# Patient Record
Sex: MALE | Race: WHITE | Employment: OTHER | URBAN - METROPOLITAN AREA
[De-identification: names, ages, dates, MRNs, and addresses within clinical notes are randomized per-mention and may not be internally consistent; named-entity substitution may affect disease eponyms.]

---

## 2017-06-29 ENCOUNTER — HOSPITAL ENCOUNTER (EMERGENCY)
Age: 57
Discharge: LEFT BEFORE DISCHARGE | End: 2017-06-29
Attending: EMERGENCY MEDICINE
Payer: COMMERCIAL

## 2017-06-29 VITALS
HEART RATE: 97 BPM | SYSTOLIC BLOOD PRESSURE: 112 MMHG | DIASTOLIC BLOOD PRESSURE: 70 MMHG | BODY MASS INDEX: 31.5 KG/M2 | WEIGHT: 220 LBS | TEMPERATURE: 98 F | RESPIRATION RATE: 23 BRPM | HEIGHT: 70 IN | OXYGEN SATURATION: 95 %

## 2017-06-29 DIAGNOSIS — R07.9 CHEST PAIN, UNSPECIFIED TYPE: Primary | ICD-10-CM

## 2017-06-29 DIAGNOSIS — F41.0 ANXIETY ATTACK: ICD-10-CM

## 2017-06-29 DIAGNOSIS — F10.929 ALCOHOL INTOXICATION, WITH UNSPECIFIED COMPLICATION (HCC): ICD-10-CM

## 2017-06-29 LAB
ALBUMIN SERPL BCP-MCNC: 3.7 G/DL (ref 3.5–5)
ALBUMIN/GLOB SERPL: 1.1 {RATIO} (ref 1.1–2.2)
ALP SERPL-CCNC: 92 U/L (ref 45–117)
ALT SERPL-CCNC: 42 U/L (ref 12–78)
ANION GAP BLD CALC-SCNC: 11 MMOL/L (ref 5–15)
AST SERPL W P-5'-P-CCNC: 34 U/L (ref 15–37)
ATRIAL RATE: 99 BPM
BASOPHILS # BLD AUTO: 0 K/UL (ref 0–0.1)
BASOPHILS # BLD: 0 % (ref 0–1)
BILIRUB SERPL-MCNC: 0.3 MG/DL (ref 0.2–1)
BUN SERPL-MCNC: 12 MG/DL (ref 6–20)
BUN/CREAT SERPL: 12 (ref 12–20)
CALCIUM SERPL-MCNC: 8.3 MG/DL (ref 8.5–10.1)
CALCULATED P AXIS, ECG09: 69 DEGREES
CALCULATED R AXIS, ECG10: 69 DEGREES
CALCULATED T AXIS, ECG11: 75 DEGREES
CHLORIDE SERPL-SCNC: 104 MMOL/L (ref 97–108)
CO2 SERPL-SCNC: 25 MMOL/L (ref 21–32)
CREAT SERPL-MCNC: 1.01 MG/DL (ref 0.7–1.3)
DIAGNOSIS, 93000: NORMAL
DIFFERENTIAL METHOD BLD: ABNORMAL
EOSINOPHIL # BLD: 0.5 K/UL (ref 0–0.4)
EOSINOPHIL NFR BLD: 6 % (ref 0–7)
ERYTHROCYTE [DISTWIDTH] IN BLOOD BY AUTOMATED COUNT: 15.9 % (ref 11.5–14.5)
GLOBULIN SER CALC-MCNC: 3.5 G/DL (ref 2–4)
GLUCOSE SERPL-MCNC: 110 MG/DL (ref 65–100)
HCT VFR BLD AUTO: 46.3 % (ref 36.6–50.3)
HGB BLD-MCNC: 15.7 G/DL (ref 12.1–17)
LIPASE SERPL-CCNC: 335 U/L (ref 73–393)
LYMPHOCYTES # BLD AUTO: 34 % (ref 12–49)
LYMPHOCYTES # BLD: 2.7 K/UL
MCH RBC QN AUTO: 32.4 PG (ref 26–34)
MCHC RBC AUTO-ENTMCNC: 33.9 G/DL (ref 30–36.5)
MCV RBC AUTO: 95.7 FL (ref 80–99)
MONOCYTES # BLD: 1.1 K/UL (ref 0–1)
MONOCYTES NFR BLD AUTO: 14 % (ref 5–13)
NEUTS SEG # BLD: 3.7 K/UL (ref 1.8–8)
NEUTS SEG NFR BLD AUTO: 46 % (ref 32–75)
P-R INTERVAL, ECG05: 136 MS
PLATELET # BLD AUTO: 207 K/UL (ref 150–400)
POTASSIUM SERPL-SCNC: 3.9 MMOL/L (ref 3.5–5.1)
PROT SERPL-MCNC: 7.2 G/DL (ref 6.4–8.2)
Q-T INTERVAL, ECG07: 342 MS
QRS DURATION, ECG06: 102 MS
QTC CALCULATION (BEZET), ECG08: 438 MS
RBC # BLD AUTO: 4.84 M/UL (ref 4.1–5.7)
SODIUM SERPL-SCNC: 140 MMOL/L (ref 136–145)
TROPONIN I BLD-MCNC: <0.04 NG/ML (ref 0–0.08)
VENTRICULAR RATE, ECG03: 99 BPM
WBC # BLD AUTO: 7.9 K/UL (ref 4.1–11.1)

## 2017-06-29 PROCEDURE — 99285 EMERGENCY DEPT VISIT HI MDM: CPT

## 2017-06-29 PROCEDURE — 83690 ASSAY OF LIPASE: CPT | Performed by: EMERGENCY MEDICINE

## 2017-06-29 PROCEDURE — 85025 COMPLETE CBC W/AUTO DIFF WBC: CPT | Performed by: EMERGENCY MEDICINE

## 2017-06-29 PROCEDURE — 36415 COLL VENOUS BLD VENIPUNCTURE: CPT | Performed by: EMERGENCY MEDICINE

## 2017-06-29 PROCEDURE — 84484 ASSAY OF TROPONIN QUANT: CPT

## 2017-06-29 PROCEDURE — 93005 ELECTROCARDIOGRAM TRACING: CPT

## 2017-06-29 PROCEDURE — 74011250637 HC RX REV CODE- 250/637: Performed by: EMERGENCY MEDICINE

## 2017-06-29 PROCEDURE — 80053 COMPREHEN METABOLIC PANEL: CPT | Performed by: EMERGENCY MEDICINE

## 2017-06-29 RX ORDER — DIAZEPAM 5 MG/1
5 TABLET ORAL
Status: COMPLETED | OUTPATIENT
Start: 2017-06-29 | End: 2017-06-29

## 2017-06-29 RX ORDER — DIAZEPAM 5 MG/1
TABLET ORAL
Status: DISCONTINUED
Start: 2017-06-29 | End: 2017-06-29 | Stop reason: HOSPADM

## 2017-06-29 RX ADMIN — DIAZEPAM 5 MG: 5 TABLET ORAL at 01:13

## 2017-06-29 NOTE — DISCHARGE INSTRUCTIONS
We hope that we have addressed all of your medical concerns. The examination and treatment you received in the Emergency Department were for an emergent problem and were not intended as complete care. It is important that you follow up with your healthcare provider(s) for ongoing care. If your symptoms worsen or do not improve as expected, and you are unable to reach your usual health care provider(s), you should return to the Emergency Department. Today's healthcare is undergoing tremendous change, and patient satisfaction surveys are one of the many tools to assess the quality of medical care. You may receive a survey from the [employer] regarding your experience in the Emergency Department. I hope that your experience has been completely positive, particularly the medical care that I provided. As such, please participate in the survey; anything less than excellent does not meet my expectations or intentions. [facility] and [facility] participate in nationally recognized quality of care measures. If your blood pressure is greater than 120/80, as reported below, we urge that you seek medical care to address the potential of high blood pressure, commonly known as hypertension. Hypertension can be hereditary or can be caused by certain medical conditions, pain, stress, or \"white coat syndrome. \"       Please make an appointment with your health care provider(s) for follow up of your Emergency Department visit. VITALS:   Patient Vitals for the past 8 hrs:   Temp Pulse Resp BP SpO2   06/29/17 0200 - 97 23 112/70 95 %   06/29/17 0130 - (!) 102 22 105/63 96 %   06/29/17 0100 - 93 12 125/71 95 %   06/29/17 0040 98 °F (36.7 °C) (!) 108 21 136/82 98 %          Thank you for allowing us to provide you with medical care today. We realize that you have many choices for your emergency care needs.   Please choose us in the future for any continued health care needs. Anthony Peña, [facility] [address]   Office: [phone]            Recent Results (from the past 24 hour(s))   EKG, 12 LEAD, INITIAL    Collection Time: 06/29/17 12:47 AM   Result Value Ref Range    Ventricular Rate 99 BPM    Atrial Rate 99 BPM    P-R Interval 136 ms    QRS Duration 102 ms    Q-T Interval 342 ms    QTC Calculation (Bezet) 438 ms    Calculated P Axis 69 degrees    Calculated R Axis 69 degrees    Calculated T Axis 75 degrees    Diagnosis       Normal sinus rhythm  Incomplete right bundle branch block  Borderline ECG  No previous ECGs available     CBC WITH AUTOMATED DIFF    Collection Time: 06/29/17 12:48 AM   Result Value Ref Range    WBC 7.9 4.1 - 11.1 K/uL    RBC 4.84 4.10 - 5.70 M/uL    HGB 15.7 12.1 - 17.0 g/dL    HCT 46.3 36.6 - 50.3 %    MCV 95.7 80.0 - 99.0 FL    MCH 32.4 26.0 - 34.0 PG    MCHC 33.9 30.0 - 36.5 g/dL    RDW 15.9 (H) 11.5 - 14.5 %    PLATELET 527 873 - 891 K/uL    NEUTROPHILS 46 32 - 75 %    LYMPHOCYTES 34 12 - 49 %    MONOCYTES 14 (H) 5 - 13 %    EOSINOPHILS 6 0 - 7 %    BASOPHILS 0 0 - 1 %    ABS. NEUTROPHILS 3.7 1.8 - 8.0 K/UL    ABS. LYMPHOCYTES 2.7 K/UL    ABS. MONOCYTES 1.1 (H) 0.0 - 1.0 K/UL    ABS. EOSINOPHILS 0.5 (H) 0.0 - 0.4 K/UL    ABS.  BASOPHILS 0.0 0.0 - 0.1 K/UL    DF AUTOMATED     POC TROPONIN-I    Collection Time: 06/29/17 12:48 AM   Result Value Ref Range    Troponin-I (POC) <0.04 0.00 - 8.21 ng/mL   METABOLIC PANEL, COMPREHENSIVE    Collection Time: 06/29/17  1:10 AM   Result Value Ref Range    Sodium 140 136 - 145 mmol/L    Potassium 3.9 3.5 - 5.1 mmol/L    Chloride 104 97 - 108 mmol/L    CO2 25 21 - 32 mmol/L    Anion gap 11 5 - 15 mmol/L    Glucose 110 (H) 65 - 100 mg/dL    BUN 12 6 - 20 MG/DL    Creatinine 1.01 0.70 - 1.30 MG/DL    BUN/Creatinine ratio 12 12 - 20      GFR est AA >60 >60 ml/min/1.73m2    GFR est non-AA >60 >60 ml/min/1.73m2    Calcium 8.3 (L) 8.5 - 10.1 MG/DL Bilirubin, total 0.3 0.2 - 1.0 MG/DL    ALT (SGPT) 42 12 - 78 U/L    AST (SGOT) 34 15 - 37 U/L    Alk. phosphatase 92 45 - 117 U/L    Protein, total 7.2 6.4 - 8.2 g/dL    Albumin 3.7 3.5 - 5.0 g/dL    Globulin 3.5 2.0 - 4.0 g/dL    A-G Ratio 1.1 1.1 - 2.2     LIPASE    Collection Time: 06/29/17  1:10 AM   Result Value Ref Range    Lipase 335 73 - 393 U/L            Chest Pain: Care Instructions  Your Care Instructions  There are many things that can cause chest pain. Some are not serious and will get better on their own in a few days. But some kinds of chest pain need more testing and treatment. Your doctor may have recommended a follow-up visit in the next 8 to 12 hours. If you are not getting better, you may need more tests or treatment. Even though your doctor has released you, you still need to watch for any problems. The doctor carefully checked you, but sometimes problems can develop later. If you have new symptoms or if your symptoms do not get better, get medical care right away. If you have worse or different chest pain or pressure that lasts more than 5 minutes or you passed out (lost consciousness), call 911 or seek other emergency help right away. A medical visit is only one step in your treatment. Even if you feel better, you still need to do what your doctor recommends, such as going to all suggested follow-up appointments and taking medicines exactly as directed. This will help you recover and help prevent future problems. How can you care for yourself at home? · Rest until you feel better. · Take your medicine exactly as prescribed. Call your doctor if you think you are having a problem with your medicine. · Do not drive after taking a prescription pain medicine. When should you call for help? Call 911 if:  · You passed out (lost consciousness). · You have severe difficulty breathing. · You have symptoms of a heart attack.  These may include:  ¨ Chest pain or pressure, or a strange feeling in your chest.  ¨ Sweating. ¨ Shortness of breath. ¨ Nausea or vomiting. ¨ Pain, pressure, or a strange feeling in your back, neck, jaw, or upper belly or in one or both shoulders or arms. ¨ Lightheadedness or sudden weakness. ¨ A fast or irregular heartbeat. After you call 911, the  may tell you to chew 1 adult-strength or 2 to 4 low-dose aspirin. Wait for an ambulance. Do not try to drive yourself. Call your doctor today if:  · You have any trouble breathing. · Your chest pain gets worse. · You are dizzy or lightheaded, or you feel like you may faint. · You are not getting better as expected. · You are having new or different chest pain. Where can you learn more? Go to http://johnson-monserrat.info/. Enter A120 in the search box to learn more about \"Chest Pain: Care Instructions. \"  Current as of: March 20, 2017  Content Version: 11.3  © 6716-4123 [employer]. Care instructions adapted under license by [employer] (which disclaims liability or warranty for this information). If you have questions about a medical condition or this instruction, always ask your healthcare professional. [address] any warranty or liability for your use of this information. [email or web address] Activation    Thank you for requesting access to [email or web address]. Please follow the instructions below to securely access and download your online medical record. [email or web address] allows you to send messages to your doctor, view your test results, renew your prescriptions, schedule appointments, and more. How Do I Sign Up? 1. In your internet browser, go to [email or web address]  2. Click on the First Time User? Click Here link in the Sign In box. You will be redirect to the New Member Sign Up page. 3. Enter your Park.[email or web address] Access Code exactly as it appears below. You will not need to use this code after youve completed the sign-up process.  If you do not sign up before the expiration date, you must request a new code. [employer] Access Code: [email or web address]  Expires: 2017  1:00 AM (This is the date your [employer] Reef access code will )    4. Enter the last four digits of your Social Security Number (xxxx) and Date of Birth (mm/dd/yyyy) as indicated and click Submit. You will be taken to the next sign-up page. 5. Create a Digital Reef ID. This will be your Digital Reef login ID and cannot be changed, so think of one that is secure and easy to remember. 6. Create a Digital Reef password. You can change your password at any time. 7. Enter your Password Reset Question and Answer. This can be used at a later time if you forget your password. 8. Enter your e-mail address. You will receive e-mail notification when new information is available in [address] 9. Click Sign Up. You can now view and download portions of your medical record. 10. Click the Download Summary menu link to download a portable copy of your medical information. Additional Information    If you have questions, please visit the Frequently Asked Questions section of the Digital [employer]ef website at [email or web address] Remember, [employer] Re[employer] is NOT to be used for urgent needs. For medical emergencies, dial 911. Panic Attacks: Care Instructions  Your Care Instructions  During a panic attack, you may have a feeling of intense fear or terror, trouble breathing, chest pain or tightness, heartbeat changes, dizziness, sweating, and shaking. A panic attack starts suddenly and usually lasts from 5 to 20 minutes but may last even longer. You have the most anxiety about 10 minutes after the attack starts. An attack can begin with a stressful event, or it can happen without a cause. Although panic attacks can cause scary symptoms, you can learn to manage them with self-care, counseling, and medicine. Follow-up care is a key part of your treatment and safety.  Be sure to make and go to all appointments, and call your doctor if you are having problems. It's also a good idea to know your test results and keep a list of the medicines you take. How can you care for yourself at home? · Take your medicine exactly as directed. Call your doctor if you think you are having a problem with your medicine. · Go to your counseling sessions and follow-up appointments. · Recognize and accept your anxiety. Then, when you are in a situation that makes you anxious, say to yourself, \"This is not an emergency. I feel uncomfortable, but I am not in danger. I can keep going even if I feel anxious. \"  · Be kind to your body:  ¨ Relieve tension with exercise or a massage. ¨ Get enough rest.  ¨ Avoid alcohol, caffeine, nicotine, and illegal drugs. They can increase your anxiety level, cause sleep problems, or trigger a panic attack. ¨ Learn and do relaxation techniques. See below for more about these techniques. · Engage your mind. Get out and do something you enjoy. Go to a funny movie, or take a walk or hike. Plan your day. Having too much or too little to do can make you anxious. · Keep a record of your symptoms. Discuss your fears with a good friend or family member, or join a support group for people with similar problems. Talking to others sometimes relieves stress. · Get involved in social groups, or volunteer to help others. Being alone sometimes makes things seem worse than they are. · Get at least 30 minutes of exercise on most days of the week to relieve stress. Walking is a good choice. You also may want to do other activities, such as running, swimming, cycling, or playing tennis or team sports. Relaxation techniques  Do relaxation exercises for 10 to 20 minutes a day. You can play soothing, relaxing music while you do them, if you wish. · Tell others in your house that you are going to do your relaxation exercises. Ask them not to disturb you. · Find a comfortable place, away from all distractions and noise.   · Lie down on your back, or sit with your back straight. · Focus on your breathing. Make it slow and steady. · Breathe in through your nose. Breathe out through either your nose or mouth. · Breathe deeply, filling up the area between your navel and your rib cage. Breathe so that your belly goes up and down. · Do not hold your breath. · Breathe like this for 5 to 10 minutes. Notice the feeling of calmness throughout your whole body. As you continue to breathe slowly and deeply, relax by doing the following for another 5 to 10 minutes:  · Tighten and relax each muscle group in your body. You can begin at your toes and work your way up to your head. · Imagine your muscle groups relaxing and becoming heavy. · Empty your mind of all thoughts. · Let yourself relax more and more deeply. · Become aware of the state of calmness that surrounds you. · When your relaxation time is over, you can bring yourself back to alertness by moving your fingers and toes and then your hands and feet and then stretching and moving your entire body. Sometimes people fall asleep during relaxation, but they usually wake up shortly afterward. · Always give yourself time to return to full alertness before you drive a car or do anything that might cause an accident if you are not fully alert. Never play a relaxation tape while driving a car. When should you call for help? Call 911 anytime you think you may need emergency care. For example, call if:  · You feel you cannot stop from hurting yourself or someone else. Watch closely for changes in your health, and be sure to contact your doctor if:  · Your panic attacks get worse. · You have new or different anxiety. · You are not getting better as expected. Where can you learn more? Go to http://johnson-monserrat.info/. Enter H601 in the search box to learn more about \"Panic Attacks: Care Instructions. \"  Current as of: July 26, 2016  Content Version: 11.3  © 4497-3794 Healthwise[employer]. Care instructions adapted under license by [employer] (which disclaims liability or warranty for this information). If you have questions about a medical condition or this instruction, always ask your healthcare professional. [address] any warranty or liability for your use of this information.

## 2017-06-29 NOTE — ED TRIAGE NOTES
Triage note: per EMS pt reports chest pressure, anxiety. Has been drinking a lot recently while he is changing jobs and is scared it is going to cost him his new job.

## 2017-06-29 NOTE — ED NOTES
The patient refused to stay for 2 hr repeat troponin and requesting to leave. Dr Mitra Self notified; AMA paperwork signed by pt. Pt and brett' left ambulatory out of the ED awaiting Cab discharge transportation.

## 2017-06-29 NOTE — ED PROVIDER NOTES
HPI Comments: The patient presents to the ED with chest pain. He was feeling week all day. He went to stand up around 11PM tonight when his heart rate when up and he developed L sided chest pain. He describes the pain as a pressure. Pain was 7/10, but is now improved to 2/10. He felt very dizzy and had mild shortness of breath. He had nausea. He dropped to his knees. He did not have any diaphoresis. He had 7 glasses of wine tonight. His neighbor gave him aspirin. He notes being under a lot of stress regarding his finances. He denies any other concerns. Patient is a 61 y.o. male presenting with chest pain. The history is provided by the patient and a significant other. Chest Pain (Angina)    Associated symptoms include dizziness, nausea, palpitations and shortness of breath. Pertinent negatives include no abdominal pain, no cough, no diaphoresis, no fever, no headaches, no vomiting and no weakness. Past Medical History:   Diagnosis Date    Hypertension        Past Surgical History:   Procedure Laterality Date    HX ORTHOPAEDIC      bilateral feet         History reviewed. No pertinent family history. Social History     Social History    Marital status:      Spouse name: N/A    Number of children: N/A    Years of education: N/A     Occupational History    Not on file. Social History Main Topics    Smoking status: Former Smoker    Smokeless tobacco: Never Used    Alcohol use Yes    Drug use: No    Sexual activity: Not on file     Other Topics Concern    Not on file     Social History Narrative    No narrative on file         ALLERGIES: Review of patient's allergies indicates no known allergies. Review of Systems   Constitutional: Negative for appetite change, chills, diaphoresis and fever. HENT: Negative for congestion, nosebleeds and sore throat. Eyes: Negative for discharge. Respiratory: Positive for shortness of breath. Negative for cough.     Cardiovascular: Positive for chest pain and palpitations. Gastrointestinal: Positive for diarrhea and nausea. Negative for abdominal pain and vomiting. Genitourinary: Negative for dysuria. Musculoskeletal: Negative. Skin: Negative for rash. Neurological: Positive for dizziness. Negative for weakness and headaches. Hematological: Negative for adenopathy. Psychiatric/Behavioral: Negative. All other systems reviewed and are negative. Vitals:    06/29/17 0040 06/29/17 0100 06/29/17 0130 06/29/17 0200   BP: 136/82 125/71 105/63 112/70   Pulse: (!) 108 93 (!) 102 97   Resp: 21 12 22 23   Temp: 98 °F (36.7 °C)      SpO2: 98% 95% 96% 95%   Weight: 99.8 kg (220 lb)      Height: 5' 10\" (1.778 m)               Physical Exam   Constitutional: He is oriented to person, place, and time. He appears well-developed and well-nourished. Smells of alcohol. HENT:   Head: Normocephalic and atraumatic. Mouth/Throat: Oropharynx is clear and moist.   Eyes: Conjunctivae are normal.   Neck: Normal range of motion. Neck supple. Cardiovascular: Normal rate, regular rhythm and normal heart sounds. Pulmonary/Chest: Effort normal and breath sounds normal.   Abdominal: Soft. Bowel sounds are normal. There is no tenderness. Musculoskeletal: Normal range of motion. He exhibits no edema or tenderness. Neurological: He is alert and oriented to person, place, and time. No cranial nerve deficit. He exhibits normal muscle tone. Coordination normal.   Skin: Skin is warm and dry. Psychiatric: He has a normal mood and affect. His behavior is normal.   Nursing note and vitals reviewed. [facility]  ED Course       Procedures      ED EKG interpretation:  Rhythm: normal sinus rhythm; and regular . Rate (approx.): 100; Axis: normal; P wave: normal; QRS interval: incomplete RBBB; ST/T wave: non-ischemic; in  Lead: This EKG was interpreted by Giuseppe Alexander MD,ED Provider. A/P:  1. Chest pain - unclear etiology.  I suspect anxiety plays large factor. I had planned further obsrvation with a second set of enzymes. He declined to stay. He was alert and oriented and clinically sober. He understood the risk of MI and chose to leave. He was given discharge papers, copy of labs, and copy of EKG. He has been advised to return ot ED or to see his PCP today. 2. Alcohol intoxication - advised to stop drinking alcohol. Patient's results have been reviewed with them. Patient and/or family have verbally conveyed their understanding and agreement of the patient's signs, symptoms, diagnosis, treatment and prognosis and additionally agree to follow up as recommended or return to the Emergency Room should their condition change prior to follow-up. Discharge instructions have also been provided to the patient with some educational information regarding their diagnosis as well a list of reasons why they would want to return to the ER prior to their follow-up appointment should their condition change.

## 2020-06-09 ENCOUNTER — HOSPITAL ENCOUNTER (OUTPATIENT)
Dept: PREADMISSION TESTING | Age: 60
Discharge: HOME | End: 2020-06-09
Payer: MEDICAID

## 2020-06-09 VITALS
HEART RATE: 85 BPM | TEMPERATURE: 98.4 F | DIASTOLIC BLOOD PRESSURE: 84 MMHG | HEIGHT: 70 IN | BODY MASS INDEX: 27.62 KG/M2 | SYSTOLIC BLOOD PRESSURE: 142 MMHG | WEIGHT: 192.9 LBS | RESPIRATION RATE: 20 BRPM | OXYGEN SATURATION: 98 %

## 2020-06-09 LAB
ANION GAP SERPL CALC-SCNC: 5 MMOL/L (ref 5–15)
BUN SERPL-MCNC: 13 MG/DL (ref 6–20)
BUN/CREAT SERPL: 19 (ref 12–20)
CALCIUM SERPL-MCNC: 9.4 MG/DL (ref 8.5–10.1)
CHLORIDE SERPL-SCNC: 104 MMOL/L (ref 97–108)
CO2 SERPL-SCNC: 30 MMOL/L (ref 21–32)
CREAT SERPL-MCNC: 0.69 MG/DL (ref 0.7–1.3)
GLUCOSE SERPL-MCNC: 97 MG/DL (ref 65–100)
POTASSIUM SERPL-SCNC: 4.9 MMOL/L (ref 3.5–5.1)
SODIUM SERPL-SCNC: 139 MMOL/L (ref 136–145)

## 2020-06-09 PROCEDURE — 36415 COLL VENOUS BLD VENIPUNCTURE: CPT

## 2020-06-09 PROCEDURE — 80048 BASIC METABOLIC PNL TOTAL CA: CPT

## 2020-06-09 PROCEDURE — 93005 ELECTROCARDIOGRAM TRACING: CPT

## 2020-06-09 PROCEDURE — 87635 SARS-COV-2 COVID-19 AMP PRB: CPT

## 2020-06-09 RX ORDER — TRAZODONE HYDROCHLORIDE 100 MG/1
100 TABLET ORAL
COMMUNITY

## 2020-06-09 RX ORDER — TRAMADOL HYDROCHLORIDE 50 MG/1
50 TABLET ORAL
COMMUNITY
End: 2021-03-31

## 2020-06-09 RX ORDER — DIAZEPAM 5 MG/1
5 TABLET ORAL EVERY MORNING
COMMUNITY

## 2020-06-09 RX ORDER — VENLAFAXINE 37.5 MG/1
75 TABLET ORAL EVERY MORNING
COMMUNITY

## 2020-06-09 RX ORDER — AMLODIPINE BESYLATE 5 MG/1
5 TABLET ORAL
COMMUNITY

## 2020-06-09 RX ORDER — SILDENAFIL 50 MG/1
50 TABLET, FILM COATED ORAL
COMMUNITY

## 2020-06-09 NOTE — PERIOP NOTES
6/9/20 - Call placed to [address], PTA medications completed. Call placed to [facility] at Dr. Chitra Caro office to verify consent and posting, she will call scheduling to have posting updated. 6/10/20 - Received message from patient, return call to patient and PTA medications verified. Call placed to [facility] at Dr. Chitra Caro office to verify consent and posting. Per [facility], posting is correct and hardware will not be removed. She will send new orders to PAT and new consent to be signed DOS.

## 2020-06-09 NOTE — PERIOP NOTES
[address]                   [facility], [address] [facility]   MAIN OR                                  [phone]   MAIN PRE OP                          [phone]                                                                                AMBULATORY PRE OP          [phone]  PRE-ADMISSION TESTING    [phone]   Surgery Date:  Saturday 6/13/20       Is surgery arrival time given by surgeon? NO  If NO, [facility] staff will call you between 3 and 7pm the day before your surgery with your arrival time. (If your surgery is on a Monday, we will call you the Friday before.)    Call [phone] after 7pm Monday-Friday if you did not receive this call. INSTRUCTIONS BEFORE YOUR SURGERY   When You  Arrive Arrive at the ER Admitting Desk on the day of your surgery  Have your insurance card, photo ID, and any copayment (if needed)   Food   and   Drink NO food or drink after midnight the night before surgery    This means NO water, gum, mints, coffee, juice, etc.  No alcohol (beer, wine, liquor) 24 hours before and after surgery   Medications to   TAKE   Morning of Surgery MEDICATIONS TO TAKE THE MORNING OF SURGERY WITH A SIP OF WATER:    Valium if needed, amlodipine, effexor    Hold viagra x 48hrs prior to surgery   Medications  To  STOP      7 days before surgery  Non-Steroidal anti-inflammatory Drugs (NSAID's): for example, Ibuprofen (Advil, Motrin), Naproxen (Aleve)   Aspirin, if taking for pain    Herbal supplements, vitamins, and fish oil   Other:  (Pain medications not listed above, including Tylenol may be taken)   Blood  Thinners     Bathing Clothing  Jewelry  Valuables      If you shower the morning of surgery, please do not apply anything to your skin (lotions, powders, deodorant)   Follow Chlorhexidine Care Fusion body wash instructions provided to you during PAT appointment. Begin 3 days prior to surgery.    Do not shave or trim anywhere 24 hours before surgery   Wear your hair loose or down; no pony-tails, buns, or metal hair clips   Wear loose, comfortable, clean clothes   Wear glasses instead of contacts   Leave money, valuables, and jewelry, including body piercings, at home   Going Home - or Spending the Night  SAME-DAY SURGERY: You must have a responsible adult drive you home and stay with you 24 hours after surgery   ADMITS: If your doctor is keeping you in the hospital after surgery, leave personal belongings/luggage in your car until you have a hospital room number. Hospital discharge time is 12 noon  Drivers must be here before 12 noon unless you are told differently   Special Instructions It is now mandated that all surgical patients be tested for COVID-19 prior to surgery. Patients are advised to self-quarantine at home after testing and prior to your surgery date. You will be notified if your results are positive. What to watch for:   Coronavirus (COVID-19) affects different people in different ways   It also appears with a wide range of symptoms from mild to severe   Signs usually appear 2-14 days after exposure     If you develop any of the following, notify your doctor immediately:  o Fever  o Chills, with or without a shiver  o Muscle pain  o Headache  o Sore throat  o Dry cough  o New loss of taste or smell  o Tiredness      If you develop any of the following, call 911:  o Shortness of breath  o Difficulty breathing  o Chest pain  o New confusion  o Blueness of fingers and/or lips     Follow all instructions so your surgery wont be cancelled. Please, be on time. If a situation occurs and you are delayed the day of surgery, call [phone] or [phone]. If your physical condition changes (like a fever, cold, flu, etc.) call your surgeon. Home medication(s) reviewed and verified via   [address] [place] [address] Street to pharmacy   during PAT appointment.     The patient was contacted by IN-PERSON  The patient verbalizes understanding of all instructions and   DOES NOT   need reinforcement.

## 2020-06-09 NOTE — H&P (VIEW-ONLY)
Preoperative Evaluation History and Physical with Surgical Risk Stratification 6/9/2020 CC: Right foot pain Surgery: FUSION FIRST METATARSOPHALANGEAL JOINT BONE RIGHT FOOT, BONE AUTOGRAFT RIGHT FOOT, PARTIAL EXCISION OF BONE 4TH TOE RIGHT FOOT, HAMMERTOE REPAIR FIFTH TOE RIGHT FOOT  
 
HPI:  
Antwan Babin is a 60 y.o. male referred for pre-operative evaluation by Dr. Anisha Villa for surgery on 6/13/20. Mr. Radha Samayoa states he had bilateral bunion surgery in 2006. He notes he has had bone growth along with calcium growth around his joint causing an bulge around his big toe along with pain. He notes he has bilateral foot pain but will do his right before his left. He notes he has increased pain with tight shoes. The patient was evaluated in the surgeon's office and it was determined that the most appropriate plan of care is to proceed with surgical intervention. Patient's PCP Eduin Saenz MD 
 
Review of Systems Constitutional: Negative for chills and fever HENT: Negative for congestion and sore throat Eyes: negative for blurred vision and double vision Respiratory: Negative for cough, shortness of breath and wheezing Mouth: Negative for loose, broken or chipped teeth. Cardiovascular: Negative for chest pain and palpitations Gastrointestinal: Negative for abdominal pain, constipation, diarrhea and nausea Genitourinary: Negative for dysuria and hematuria Musculoskeletal: Foot pain Skin: Negative for rash, open wounds. Negative for bruises easily Neurological: Negative for dizziness, tremors and headaches Psychiatric: Negative for depression. The patient is not nervous/anxious. Inherent Risk of Surgery Surgical risk:  Low Low:  EIntermediate:   High:   
Patient Cardiac Risk Assessment Revised Cardiac Risk Index (RCRI) Rate if cardiac death, nonfatal MI, nonfatal cardiac arrest by number of risk factor- 0.4% BERNARD/AHA 2007 Guidelines: 1) Surgery Emergency, Non-cardiac -> to surgery 2) If not, look at clinical predictors Major Intermediate Minor Blood Thinner: NA 
 
METS  
 
 EQUAL TO 4 Care for self Walk indoors around house Walk 2-3 blocks on level ground (2-3 mph) Light work around house (dust, dishes) Other Risk Factors:  
Screening for ETOH use:  Done and low risk Smoking status:  Former Drug Use: Last marijuana was March Personal or FH of bleeding problems:  No 
Personal or FH of blood clots:  No 
Personal or FH of anesthesia problems:   No 
 
Pulmonary Risk: 
Asthma or COPD:  No 
Body mass index is 28.08 kg/m². Known ANNA MARIE:  No 
BUN normal 
 
Past Medical, Surgical, Social History Allergies: No Known Allergies Medication Documentation Review Audit Reviewed by Mary Kate Hussein RN (Registered Nurse) on 06/09/20 at 6246 Medication Sig Documenting Provider Last Dose Status Taking? amLODIPine (NORVASC) 5 mg tablet Take 5 mg by mouth every morning. Provider, Historical  Active Yes  
diazePAM (Valium) 5 mg tablet Take 5 mg by mouth every six (6) hours as needed for Anxiety. Provider, Historical  Active Yes  
sildenafil citrate (Viagra) 50 mg tablet Take 50 mg by mouth daily as needed for Erectile Dysfunction. Provider, Historical  Active Yes  
traMADoL (ULTRAM) 50 mg tablet Take 50 mg by mouth every eight (8) hours as needed for Pain. Provider, Historical  Active Yes  
traZODone (DESYREL) 100 mg tablet Take 100 mg by mouth nightly. Provider, Historical  Active Yes  
venlafaxine (EFFEXOR) 37.5 mg tablet Take 37.5 mg by mouth daily. Provider, Historical  Active Yes Past Medical History:  
Diagnosis Date  Anxiety  Hx of migraines 2018  Hypertension  Insomnia disorder  Orbital fracture ([facility] [address] 2018 Right eye from fall  PTSD (post-traumatic stress disorder) Past Surgical History:  
Procedure Laterality Date  HX BUNIONECTOMY Bilateral 2006  HX COLONOSCOPY  2015  HX SEPTOPLASTY  [id number] Social History Tobacco Use  Smoking status: Former Smoker Packs/day: 0.50 Types: Cigarettes Last attempt to quit: 2020 Years since quittin.0  Smokeless tobacco: Never Used  Tobacco comment: Stopped for surgery- several weeks ago Substance Use Topics  Alcohol use: Yes Alcohol/week: 6.0 standard drinks Types: 6 Glasses of wine per week  Drug use: Not Currently Types: Marijuana Comment: Since 2020 Family History Problem Relation Age of Onset  Anesth Problems Neg Hx  Deep Vein Thrombosis Neg Hx Objective Vitals:  
 20 1058 BP: 142/84 Pulse: 85 Resp: 20 Temp: 98.4 °F (36.9 °C) SpO2: 98% Weight: 87.5 kg (192 lb 14.4 oz) Height: 5' 9.5\" (1.765 m) Constitutional:  Appears well,  No Acute Distress, Vitals noted Psychiatric:   Affect normal, Alert and Oriented to person/place/time Eyes:   Pupils equally round and reactive, EOMI, conjunctiva clear, eyelids normal 
ENT:   External ears and nose normal, teeth normal, gums normal, TMs and Orophyarynx normal 
Neck:   General inspection and Thyroid normal.  No abnormal cervical or supraclavicular nodes Lungs:   Clear to auscultation, good respiratory effort Heart: Ausculation normal.  Regular rhythm. No cardiac murmurs. No carotid bruits or palpable thrills Chest wall normal 
Musculoskeletal: Normal gait Extremities:   Without edema, good peripheral pulses Skin:   Warm to palpation, without rashes, bruising, or suspicious lesions Recent Results (from the past 72 hour(s)) METABOLIC PANEL, BASIC Collection Time: 20 11:40 AM  
Result Value Ref Range Sodium 139 136 - 145 mmol/L Potassium 4.9 3.5 - 5.1 mmol/L Chloride 104 97 - 108 mmol/L  
 CO2 30 21 - 32 mmol/L Anion gap 5 5 - 15 mmol/L Glucose 97 65 - 100 mg/dL BUN 13 6 - 20 MG/DL  Creatinine 0.69 (L) 0.70 - 1.30 MG/DL  
 BUN/Creatinine ratio 19 12 - 20 GFR est AA >60 >60 ml/min/1.73m2 GFR est non-AA >60 >60 ml/min/1.73m2 Calcium 9.4 8.5 - 10.1 MG/DL  
EKG, 12 LEAD, INITIAL Collection Time: 06/09/20 12:01 PM  
Result Value Ref Range Ventricular Rate 73 BPM  
 Atrial Rate 73 BPM  
 P-R Interval 130 ms QRS Duration 92 ms Q-T Interval 384 ms QTC Calculation (Bezet) 423 ms Calculated P Axis 1 degrees Calculated R Axis 60 degrees Calculated T Axis 81 degrees Diagnosis Normal sinus rhythm with sinus arrhythmia Normal ECG When compared with ECG of 29-JUN-2017 00:47, No significant change was found Assessment and Plan Assessment/Plan:  
1) Foot pain 2) Pre-Operative Evaluation Labs and EKG reviewed. M 
 
Preoperative Clearance Per RCRI, the patient has a 0.4% risk of cardiac death, nonfatal MI, nonfatal cardiac arrest based on no risk factors. Per ACC/AHA guidelines, patient is low risk for a(n) low risk surgery and may proceed to planned surgery with the above noted risk.  
 
Sumaya Woodward NP

## 2020-06-09 NOTE — H&P
Preoperative Evaluation                     History and Physical with Surgical Risk Stratification     6/9/2020    CC: Right foot pain  Surgery: FUSION FIRST METATARSOPHALANGEAL JOINT BONE RIGHT FOOT, BONE AUTOGRAFT RIGHT FOOT, PARTIAL EXCISION OF BONE 4TH TOE RIGHT FOOT, HAMMERTOE REPAIR FIFTH TOE RIGHT FOOT     HPI:   Mike Mike is a 60 y.o. male referred for pre-operative evaluation by Dr. Alaina Barreto for surgery on 6/13/20. Mr. Zander Odom states he had bilateral bunion surgery in 2006. He notes he has had bone growth along with calcium growth around his joint causing an bulge around his big toe along with pain. He notes he has bilateral foot pain but will do his right before his left. He notes he has increased pain with tight shoes. The patient was evaluated in the surgeon's office and it was determined that the most appropriate plan of care is to proceed with surgical intervention. Patient's PCP Fer Arenas MD    Review of Systems     Constitutional: Negative for chills and fever  HENT: Negative for congestion and sore throat  Eyes: negative for blurred vision and double vision  Respiratory: Negative for cough, shortness of breath and wheezing  Mouth: Negative for loose, broken or chipped teeth. Cardiovascular: Negative for chest pain and palpitations  Gastrointestinal: Negative for abdominal pain, constipation, diarrhea and nausea  Genitourinary: Negative for dysuria and hematuria  Musculoskeletal: Foot pain  Skin: Negative for rash, open wounds. Negative for bruises easily  Neurological: Negative for dizziness, tremors and headaches  Psychiatric: Negative for depression. The patient is not nervous/anxious.     Inherent Risk of Surgery     Surgical risk:  Low  Low:  EIntermediate:   High:    Patient Cardiac Risk Assessment     Revised Cardiac Risk Index (RCRI)    Rate if cardiac death, nonfatal MI, nonfatal cardiac arrest by number of risk factor- 0.4%    BERNARD/AHA 2007 Guidelines:   1) Surgery Emergency, Non-cardiac -> to surgery  2) If not, look at clinical predictors    Major Intermediate Minor       Blood Thinner: NA    METS      EQUAL TO 4 Care for self Walk indoors around house Walk 2-3 blocks on level ground (2-3 mph) Light work around house (dust, dishes)     Other Risk Factors:   Screening for ETOH use:  Done and low risk  Smoking status:  Former  Drug Use: Last marijuana was March     Personal or FH of bleeding problems:  No  Personal or FH of blood clots:  No  Personal or FH of anesthesia problems:   No    Pulmonary Risk:  Asthma or COPD:  No  Body mass index is 28.08 kg/m². Known ANNA MARIE:  No  BUN normal    Past Medical, Surgical, Social History     Allergies: No Known Allergies    Medication Documentation Review Audit     Reviewed by Abdias London RN (Registered Nurse) on 06/09/20 at [id number]    Medication Sig Documenting Provider Last Dose Status Taking? amLODIPine (NORVASC) 5 mg tablet Take 5 mg by mouth every morning. Provider, Historical  Active Yes   diazePAM (Valium) 5 mg tablet Take 5 mg by mouth every six (6) hours as needed for Anxiety. Provider, Historical  Active Yes   sildenafil citrate (Viagra) 50 mg tablet Take 50 mg by mouth daily as needed for Erectile Dysfunction. Provider, Historical  Active Yes   traMADoL (ULTRAM) 50 mg tablet Take 50 mg by mouth every eight (8) hours as needed for Pain. Provider, Historical  Active Yes   traZODone (DESYREL) 100 mg tablet Take 100 mg by mouth nightly. Provider, Historical  Active Yes   venlafaxine (EFFEXOR) 37.5 mg tablet Take 37.5 mg by mouth daily.  Provider, Historical  Active Yes                Past Medical History:   Diagnosis Date    Anxiety     Hx of migraines 2018    Hypertension     Insomnia disorder     Orbital fracture ([facility] [address] 2018    Right eye from fall    PTSD (post-traumatic stress disorder)      Past Surgical History:   Procedure Laterality Date    HX BUNIONECTOMY Bilateral 2006    HX COLONOSCOPY  2015    HX SEPTOPLASTY  1975     Social History     Tobacco Use    Smoking status: Former Smoker     Packs/day: 0.50     Types: Cigarettes     Last attempt to quit: 2020     Years since quittin.0    Smokeless tobacco: Never Used    Tobacco comment: Stopped for surgery- several weeks ago   Substance Use Topics    Alcohol use: Yes     Alcohol/week: 6.0 standard drinks     Types: 6 Glasses of wine per week    Drug use: Not Currently     Types: Marijuana     Comment: Since 2020     Family History   Problem Relation Age of Onset    Anesth Problems Neg Hx     Deep Vein Thrombosis Neg Hx        Objective     Vitals:    20 1058   BP: 142/84   Pulse: 85   Resp: 20   Temp: 98.4 °F (36.9 °C)   SpO2: 98%   Weight: 87.5 kg (192 lb 14.4 oz)   Height: 5' 9.5\" (1.765 m)       Constitutional:  Appears well,  No Acute Distress, Vitals noted  Psychiatric:   Affect normal, Alert and Oriented to person/place/time    Eyes:   Pupils equally round and reactive, EOMI, conjunctiva clear, eyelids normal  ENT:   External ears and nose normal, teeth normal, gums normal, TMs and Orophyarynx normal  Neck:   General inspection and Thyroid normal.  No abnormal cervical or supraclavicular nodes    Lungs:   Clear to auscultation, good respiratory effort  Heart: Ausculation normal.  Regular rhythm. No cardiac murmurs.   No carotid bruits or palpable thrills  Chest wall normal  Musculoskeletal: Normal gait  Extremities:   Without edema, good peripheral pulses  Skin:   Warm to palpation, without rashes, bruising, or suspicious lesions     Recent Results (from the past 72 hour(s))   METABOLIC PANEL, BASIC    Collection Time: 20 11:40 AM   Result Value Ref Range    Sodium 139 136 - 145 mmol/L    Potassium 4.9 3.5 - 5.1 mmol/L    Chloride 104 97 - 108 mmol/L    CO2 30 21 - 32 mmol/L    Anion gap 5 5 - 15 mmol/L    Glucose 97 65 - 100 mg/dL    BUN 13 6 - 20 MG/DL    Creatinine 0.69 (L) 0.70 - 1.30 MG/DL    BUN/Creatinine ratio 19 12 - 20      GFR est AA >60 >60 ml/min/1.73m2    GFR est non-AA >60 >60 ml/min/1.73m2    Calcium 9.4 8.5 - 10.1 MG/DL   EKG, 12 LEAD, INITIAL    Collection Time: 06/09/20 12:01 PM   Result Value Ref Range    Ventricular Rate 73 BPM    Atrial Rate 73 BPM    P-R Interval 130 ms    QRS Duration 92 ms    Q-T Interval 384 ms    QTC Calculation (Bezet) 423 ms    Calculated P Axis 1 degrees    Calculated R Axis 60 degrees    Calculated T Axis 81 degrees    Diagnosis       Normal sinus rhythm with sinus arrhythmia  Normal ECG  When compared with ECG of 29-JUN-2017 00:47,  No significant change was found         Assessment and Plan     Assessment/Plan:   1) Foot pain  2) Pre-Operative Evaluation    Labs and EKG reviewed. M    Preoperative Clearance  Per RCRI, the patient has a 0.4% risk of cardiac death, nonfatal MI, nonfatal cardiac arrest based on no risk factors. Per ACC/AHA guidelines, patient is low risk for a(n) low risk surgery and may proceed to planned surgery with the above noted risk.     Mya Rajan NP

## 2020-06-10 LAB
ATRIAL RATE: 73 BPM
CALCULATED P AXIS, ECG09: 1 DEGREES
CALCULATED R AXIS, ECG10: 60 DEGREES
CALCULATED T AXIS, ECG11: 81 DEGREES
DIAGNOSIS, 93000: NORMAL
P-R INTERVAL, ECG05: 130 MS
Q-T INTERVAL, ECG07: 384 MS
QRS DURATION, ECG06: 92 MS
QTC CALCULATION (BEZET), ECG08: 423 MS
SARS-COV-2, COV2NT: NOT DETECTED
VENTRICULAR RATE, ECG03: 73 BPM

## 2020-06-13 ENCOUNTER — ANESTHESIA EVENT (OUTPATIENT)
Dept: SURGERY | Age: 60
End: 2020-06-13
Payer: MEDICAID

## 2020-06-13 ENCOUNTER — ANESTHESIA (OUTPATIENT)
Dept: SURGERY | Age: 60
End: 2020-06-13
Payer: MEDICAID

## 2020-06-13 ENCOUNTER — HOSPITAL ENCOUNTER (OUTPATIENT)
Age: 60
Setting detail: OUTPATIENT SURGERY
Discharge: HOME | End: 2020-06-13
Attending: PODIATRIST | Admitting: PODIATRIST
Payer: MEDICAID

## 2020-06-13 ENCOUNTER — APPOINTMENT (OUTPATIENT)
Dept: GENERAL RADIOLOGY | Age: 60
End: 2020-06-13
Attending: PODIATRIST
Payer: MEDICAID

## 2020-06-13 VITALS
RESPIRATION RATE: 17 BRPM | TEMPERATURE: 97.5 F | HEART RATE: 78 BPM | SYSTOLIC BLOOD PRESSURE: 138 MMHG | DIASTOLIC BLOOD PRESSURE: 82 MMHG | OXYGEN SATURATION: 99 %

## 2020-06-13 PROBLEM — M94.271 CHONDROMALACIA, RIGHT ANKLE AND JOINTS OF RIGHT FOOT: Status: ACTIVE | Noted: 2020-06-13

## 2020-06-13 PROBLEM — M20.21 HALLUX RIGIDUS OF RIGHT FOOT: Status: ACTIVE | Noted: 2020-06-13

## 2020-06-13 PROBLEM — M79.671 RIGHT FOOT PAIN: Status: ACTIVE | Noted: 2020-06-13

## 2020-06-13 PROBLEM — M20.41 HAMMERTOE OF RIGHT FOOT: Status: ACTIVE | Noted: 2020-06-13

## 2020-06-13 PROBLEM — M77.51 BONE SPUR OF TOE OF RIGHT FOOT: Status: ACTIVE | Noted: 2020-06-13

## 2020-06-13 PROCEDURE — 74011000250 HC RX REV CODE- 250: Performed by: PODIATRIST

## 2020-06-13 PROCEDURE — C1769 GUIDE WIRE: HCPCS | Performed by: PODIATRIST

## 2020-06-13 PROCEDURE — 77030018836 HC SOL IRR NACL ICUM -A: Performed by: PODIATRIST

## 2020-06-13 PROCEDURE — 64447 NJX AA&/STRD FEMORAL NRV IMG: CPT | Performed by: NURSE ANESTHETIST, CERTIFIED REGISTERED

## 2020-06-13 PROCEDURE — 73630 X-RAY EXAM OF FOOT: CPT

## 2020-06-13 PROCEDURE — 74011250636 HC RX REV CODE- 250/636: Performed by: PODIATRIST

## 2020-06-13 PROCEDURE — 76010000153 HC OR TIME 1.5 TO 2 HR: Performed by: PODIATRIST

## 2020-06-13 PROCEDURE — 77030037938 HC BLD OSC SAW TREA -B: Performed by: PODIATRIST

## 2020-06-13 PROCEDURE — 76060000034 HC ANESTHESIA 1.5 TO 2 HR: Performed by: PODIATRIST

## 2020-06-13 PROCEDURE — C1713 ANCHOR/SCREW BN/BN,TIS/BN: HCPCS | Performed by: PODIATRIST

## 2020-06-13 PROCEDURE — 77030008845 HC WRE K STRY -B: Performed by: PODIATRIST

## 2020-06-13 PROCEDURE — 77030013837 HC NERV BLK KT BBMI -B: Performed by: NURSE ANESTHETIST, CERTIFIED REGISTERED

## 2020-06-13 PROCEDURE — 77030020833 HC CAP PROTCT PIN ASPN -A: Performed by: PODIATRIST

## 2020-06-13 PROCEDURE — 74011250636 HC RX REV CODE- 250/636: Performed by: NURSE ANESTHETIST, CERTIFIED REGISTERED

## 2020-06-13 PROCEDURE — 77030011640 HC PAD GRND REM COVD -A: Performed by: PODIATRIST

## 2020-06-13 PROCEDURE — 76210000006 HC OR PH I REC 0.5 TO 1 HR: Performed by: PODIATRIST

## 2020-06-13 PROCEDURE — 64445 NJX AA&/STRD SCIATIC NRV IMG: CPT | Performed by: NURSE ANESTHETIST, CERTIFIED REGISTERED

## 2020-06-13 PROCEDURE — 77030031139 HC SUT VCRL2 J&J -A: Performed by: PODIATRIST

## 2020-06-13 PROCEDURE — 77030003746: Performed by: PODIATRIST

## 2020-06-13 PROCEDURE — 77030000032 HC CUF TRNQT ZIMM -B: Performed by: PODIATRIST

## 2020-06-13 PROCEDURE — 74011250636 HC RX REV CODE- 250/636: Performed by: ANESTHESIOLOGY

## 2020-06-13 PROCEDURE — 74011000250 HC RX REV CODE- 250: Performed by: NURSE ANESTHETIST, CERTIFIED REGISTERED

## 2020-06-13 PROCEDURE — 74011000250 HC RX REV CODE- 250: Performed by: ANESTHESIOLOGY

## 2020-06-13 PROCEDURE — 77030002916 HC SUT ETHLN J&J -A: Performed by: PODIATRIST

## 2020-06-13 PROCEDURE — 77030003601 HC NDL NRV BLK BBMI -A: Performed by: NURSE ANESTHETIST, CERTIFIED REGISTERED

## 2020-06-13 PROCEDURE — 76210000021 HC REC RM PH II 0.5 TO 1 HR: Performed by: PODIATRIST

## 2020-06-13 PROCEDURE — 77030041049 HC RMR DISP STRY -D: Performed by: PODIATRIST

## 2020-06-13 PROCEDURE — 77030039824: Performed by: PODIATRIST

## 2020-06-13 PROCEDURE — 77030006773 HC BLD SAW OSC BRSM -A: Performed by: PODIATRIST

## 2020-06-13 RX ORDER — HYDROMORPHONE HYDROCHLORIDE 1 MG/ML
.5-1 INJECTION, SOLUTION INTRAMUSCULAR; INTRAVENOUS; SUBCUTANEOUS
Status: DISCONTINUED | OUTPATIENT
Start: 2020-06-13 | End: 2020-06-13 | Stop reason: HOSPADM

## 2020-06-13 RX ORDER — LIDOCAINE HYDROCHLORIDE 10 MG/ML
0.1 INJECTION, SOLUTION EPIDURAL; INFILTRATION; INTRACAUDAL; PERINEURAL AS NEEDED
Status: DISCONTINUED | OUTPATIENT
Start: 2020-06-13 | End: 2020-06-13 | Stop reason: HOSPADM

## 2020-06-13 RX ORDER — ONDANSETRON 2 MG/ML
4 INJECTION INTRAMUSCULAR; INTRAVENOUS AS NEEDED
Status: DISCONTINUED | OUTPATIENT
Start: 2020-06-13 | End: 2020-06-13 | Stop reason: HOSPADM

## 2020-06-13 RX ORDER — BUPIVACAINE HYDROCHLORIDE 5 MG/ML
INJECTION, SOLUTION EPIDURAL; INTRACAUDAL AS NEEDED
Status: DISCONTINUED | OUTPATIENT
Start: 2020-06-13 | End: 2020-06-13 | Stop reason: HOSPADM

## 2020-06-13 RX ORDER — PROPOFOL 10 MG/ML
INJECTION, EMULSION INTRAVENOUS
Status: DISCONTINUED | OUTPATIENT
Start: 2020-06-13 | End: 2020-06-13 | Stop reason: HOSPADM

## 2020-06-13 RX ORDER — MIDAZOLAM HYDROCHLORIDE 1 MG/ML
INJECTION, SOLUTION INTRAMUSCULAR; INTRAVENOUS
Status: COMPLETED
Start: 2020-06-13 | End: 2020-06-13

## 2020-06-13 RX ORDER — SODIUM CHLORIDE, SODIUM LACTATE, POTASSIUM CHLORIDE, CALCIUM CHLORIDE 600; 310; 30; 20 MG/100ML; MG/100ML; MG/100ML; MG/100ML
INJECTION, SOLUTION INTRAVENOUS
Status: DISCONTINUED | OUTPATIENT
Start: 2020-06-13 | End: 2020-06-13

## 2020-06-13 RX ORDER — FENTANYL CITRATE 50 UG/ML
INJECTION, SOLUTION INTRAMUSCULAR; INTRAVENOUS AS NEEDED
Status: DISCONTINUED | OUTPATIENT
Start: 2020-06-13 | End: 2020-06-13 | Stop reason: HOSPADM

## 2020-06-13 RX ORDER — LIDOCAINE HYDROCHLORIDE 20 MG/ML
INJECTION, SOLUTION INFILTRATION; PERINEURAL AS NEEDED
Status: DISCONTINUED | OUTPATIENT
Start: 2020-06-13 | End: 2020-06-13 | Stop reason: HOSPADM

## 2020-06-13 RX ORDER — FENTANYL CITRATE 50 UG/ML
INJECTION, SOLUTION INTRAMUSCULAR; INTRAVENOUS
Status: COMPLETED
Start: 2020-06-13 | End: 2020-06-13

## 2020-06-13 RX ORDER — MIDAZOLAM HYDROCHLORIDE 1 MG/ML
INJECTION, SOLUTION INTRAMUSCULAR; INTRAVENOUS AS NEEDED
Status: DISCONTINUED | OUTPATIENT
Start: 2020-06-13 | End: 2020-06-13 | Stop reason: HOSPADM

## 2020-06-13 RX ORDER — PROPOFOL 10 MG/ML
INJECTION, EMULSION INTRAVENOUS AS NEEDED
Status: DISCONTINUED | OUTPATIENT
Start: 2020-06-13 | End: 2020-06-13 | Stop reason: HOSPADM

## 2020-06-13 RX ORDER — SODIUM CHLORIDE, SODIUM LACTATE, POTASSIUM CHLORIDE, CALCIUM CHLORIDE 600; 310; 30; 20 MG/100ML; MG/100ML; MG/100ML; MG/100ML
125 INJECTION, SOLUTION INTRAVENOUS CONTINUOUS
Status: DISCONTINUED | OUTPATIENT
Start: 2020-06-13 | End: 2020-06-13 | Stop reason: HOSPADM

## 2020-06-13 RX ADMIN — PROPOFOL 140 MCG/KG/MIN: 10 INJECTION, EMULSION INTRAVENOUS at 10:43

## 2020-06-13 RX ADMIN — SODIUM CHLORIDE, POTASSIUM CHLORIDE, SODIUM LACTATE AND CALCIUM CHLORIDE: 600; 310; 30; 20 INJECTION, SOLUTION INTRAVENOUS at 10:00

## 2020-06-13 RX ADMIN — BUPIVACAINE HYDROCHLORIDE 10 ML/HR: 7.5 INJECTION, SOLUTION EPIDURAL; RETROBULBAR at 12:27

## 2020-06-13 RX ADMIN — PROPOFOL 30 MG: 10 INJECTION, EMULSION INTRAVENOUS at 10:43

## 2020-06-13 RX ADMIN — FENTANYL CITRATE 100 MCG: 0.05 INJECTION, SOLUTION INTRAMUSCULAR; INTRAVENOUS at 10:10

## 2020-06-13 RX ADMIN — HYDROMORPHONE HYDROCHLORIDE 0.5 MG: 1 INJECTION, SOLUTION INTRAMUSCULAR; INTRAVENOUS; SUBCUTANEOUS at 12:26

## 2020-06-13 RX ADMIN — CEFAZOLIN SODIUM 2 G: 1 POWDER, FOR SOLUTION INTRAMUSCULAR; INTRAVENOUS at 10:47

## 2020-06-13 RX ADMIN — MIDAZOLAM HYDROCHLORIDE 2 MG: 2 INJECTION, SOLUTION INTRAMUSCULAR; INTRAVENOUS at 10:10

## 2020-06-13 RX ADMIN — LIDOCAINE HYDROCHLORIDE 80 MG: 20 INJECTION, SOLUTION INFILTRATION; PERINEURAL at 10:43

## 2020-06-13 RX ADMIN — ROPIVACAINE HYDROCHLORIDE 30 ML: 5 INJECTION, SOLUTION EPIDURAL; INFILTRATION; PERINEURAL at 10:26

## 2020-06-13 NOTE — OP NOTES
Operative Report    Patient: Placido Park MRN: [id number]  SSN: [id number]    YOB: 1960  Age: 60 y.o. 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 He previously had a joint replacement on the left foot but continues to have pain to the joint. Right now the right is the worst between the two. Pain is with all range of motion. Radiographic imaging was obtained in the office which showed severe arthritic change to the joint. Patient also complained of pain due to right fourth and fifth toe contractures and bone spurs. Patient attempted nonsurgical management with medications as well as supportive shoe gear without resolution of his symptoms. Due to these findings I recommended surgical intervention. I discussed the procedures at length including the expected postoperative course, risks, alternatives, and potential complications. No guarantees were given. The patient elected to proceed. Procedure in Detail: This 25-year-old male received a popliteal and saphenous block in preoperative holding.  He was then brought back to the operating room and placed supine on the operating room table. After adequate IV sedation, the right foot and ankle were prepped and draped in usual aseptic fashion. Next the foot was exsanguinated using an Esmarch bandage. A pneumatic ankle tourniquet was inflated to 225 mmHg. Fusion right first mtpj with bone graft: Attention was directed to the right foot great toe joint. A linear longitudinal incision was made parallel and medial to the first metatarsophalangeal joint. Incision was deepened using sharp and blunt dissection with care taken to identify and retract vital neurovascular structures. Next a dorsal capsulotomy was performed medial and parallel to the extensor hallucis longus tendon. Soft tissue attachments were dissected free from the adjacent sides of the first metatarsal interphalangeal joint. The osteophytes medially dorsally and laterally were resected from the joint. Graft was harvested after a separate incision was made. Next the remaining cartilage was removed using a Reamer on adjacent sides of the joint. There was 90% cartilage next the joint was subchondrally drilled. A bone graft was then inserted. Temporary fixation was held using K wire fixation. Next a cannulated headless screw was then inserted across the joint in a distal medial to proximal lateral orientation with the toe and slight dorsiflexion. Next a dorsal five hole Nelsy plate was applied to the dorsal first metatarsophalangeal joint. Fixation was confirmed using fluoroscopy. Next the wound was irrigated with copious amounts of normal saline and closed in layers. Hammertoe repair right 5th toe  Next attention was directed to the right fifth toe deformity. Two elliptical incisions were made obliquely over the proximal interphalangeal joint. The interposing skin was then passed from the field. Incision was deepened to expose the dorsal fifth proximal interphalangeal joint.  A dorsal capsulotomy and tenotomy was performed. Soft tissue attachments were dissected free from the head of the proximal phalanx. Next the head of the proximal phalanx was then resected and passed from the field. Next the rotational and contracture deformity was reduced and the wound was closed in layers. Fluoroscopy was performed to confirm resection. Partial excision of bone right 4th toe:  Attention was directed to the right fourth toe where there was a prominent osteophytes noted to the lateral aspect of the fourth toe proximal interphalangeal joint. A dorsal incision was made over the proximal interphalangeal joint. This was deepened to expose the lateral prominence. The lateral prominence was then resected and passed from the field. The wound was then irrigated with copious amounts of normal saline and closed in layers. Postoperative dressing was applied. Patient tolerated the procedure and anesthesia well. Patient was then transferred to the PACU for postoperative monitoring with vital signs stable and vascular status intact. Estimated Blood Loss:  1mL    Tourniquet Time:   Total Tourniquet Time Documented: Ankle (Right) - 50 minutes  Total: Ankle (Right) - 50 minutes        Implants:   Implant Name Type Inv. Item Serial No.  Lot No. LRB No. Used Action   2.5 x 32mm headless screw    N/A [employer] N/A Right 1 Implanted   variAx Wilcox Straight Plate   N/A [employer] N/A Right 1 Implanted   2.7 x 14mm locking screw   N/A [employer] N/A Right 1 Implanted   2.7 x 16mm locking screw    N/A [employer] N/A Right 3 Implanted               Specimens: * No specimens in log *        Drains: None                Complications: None    Counts: Sponge and needle counts were correct times two.     Signed By:  Anuel Perez DPM     June 13, 2020

## 2020-06-13 NOTE — ANESTHESIA PROCEDURE NOTES
Peripheral Block    Start time: 6/13/2020 10:00 AM  End time: 6/13/2020 10:20 AM  Performed by: Dariel James MD  Authorized by: Dariel James MD       Pre-procedure: Indications: at surgeon's request and post-op pain management    Preanesthetic Checklist: patient identified, risks and benefits discussed, site marked, timeout performed and anesthesia consent given      Block Type:   Block Type:  Popliteal and saphenous  Laterality:  Right  Monitoring:  Continuous pulse ox, frequent vital sign checks, heart rate, responsive to questions and oxygen  Injection Technique:  Continuous  Procedures: ultrasound guided and nerve stimulator    Patient Position: supine  Prep: chlorhexidine    Location:  Lower thigh  Needle Type:  Tuohy  Needle Gauge:  18 G  Needle Localization:  Nerve stimulator and ultrasound guidance    Assessment:  Number of attempts:  1  Injection Assessment:  Incremental injection every 5 mL, local visualized surrounding nerve on ultrasound, negative aspiration for blood, no paresthesia and no intravascular symptoms  Patient tolerance:  Patient tolerated the procedure well with no immediate complications  Tibial nerve continuous  block performed with ultrasound, nerve stimulation and landmark identification. Needle utilized was 4\"stimuplex 21G. 20cc . 5% ropivacaine administered slowly with intermittent aspirations.     Saphenous under us guidance, 10cc

## 2020-06-13 NOTE — INTERVAL H&P NOTE
Update History & Physical 
 
The Patient's History and Physical of June 9, 2020 was reviewed with the patient and I examined the patient. There was no change. The surgical site was confirmed by the patient and me. The patient was counseled at length about the risks of sona Covid-19 during their perioperative period and any recovery window from their procedure. The patient was made aware that sona Covid-19  may worsen their prognosis for recovering from their procedure and lend to a higher morbidity and/or mortality risk. All material risks, benefits, and reasonable alternatives including postponing the procedure were discussed. The patient does  wish to proceed with the procedure at this time. Plan:  The risk, benefits, expected outcome, and alternative to the recommended procedure have been discussed with the patient. Patient understands and wants to proceed with the procedure.  
 
Electronically signed by Waleska Gongora DPM on 6/13/2020 at 9:59 AM

## 2020-06-13 NOTE — BRIEF OP NOTE
Brief Postoperative Note    Patient: Feliz Benitez  YOB: 1960  MRN: [id number]    Date of Procedure: 6/13/2020     Pre-Op Diagnosis:   Patient Active Problem List   Diagnosis Code    Right foot pain M79.671    Hallux rigidus of right foot M20.21    Hammertoe of right foot M20.41    Bone spur of toe of right foot M77.51    Chondromalacia, right ankle and joints of right foot M94.271     Post-Op Diagnosis:   Patient Active Problem List   Diagnosis Code    Right foot pain M79.671    Hallux rigidus of right foot M20.21    Hammertoe of right foot M20.41    Bone spur of toe of right foot M77.51    Chondromalacia, right ankle and joints of right foot M94.271     Procedure(s):  FUSION FIRST METATARSOPHALANGEAL JOINT BONE RIGHT FOOT   BONE AUTOGRAFT RIGHT FOOT  PARTIAL EXCISION OF BONE 4TH TOE RIGHT FOOT  HAMMERTOE REPAIR FIFTH TOE RIGHT FOOT   (MAC/POP/SAPENOUS WITH CATHETER)    Surgeon(s):  Glenis Davidson DPM    Surgical Assistant: None    Anesthesia: MAC     Estimated Blood Loss (mL): 1mL    Complications: None    Specimens: * No specimens in log *     Implants:   Implant Name Type Inv. Item Serial No.  Lot No. LRB No. Used Action   2.5 x 32mm headless screw    N/A [employer] N/A Right 1 Implanted   variAx Wilcox Straight Plate   N/A [employer] N/A Right 1 Implanted   2.7 x 14mm locking screw   N/A [employer] N/A Right 1 Implanted   2.7 x 16mm locking screw    N/A [employer] N/A Right 3 Implanted       Drains: * No LDAs found *    Findings: severe arthrosis first mtpj without any articular cartilage. Periarticular osteophytes.  Osteophyte 4th toe lateral, 5th toe digiti quinti varus hammertoe     Electronically Signed by Roscoe Fortune DPM on 6/13/2020 at 12:05 PM

## 2020-06-13 NOTE — DISCHARGE INSTRUCTIONS
Dr. Yumiko Vergara. Lety, ADALBERTO FACFAS FACFAOM FACCWS    The [facility]  Post Operative Instructions: You have had a surgical procedure on your right foot. Fluids and Diet:  Begin with clear liquids, broth, dry toast, and crackers. If not nauseated then resume your regular pre-operative diet when you are ready    Medications: Take your prescriptions as directed. Take pain medication tonight before bed even if there is no pain. Start aspirin daily tomorrow. If your pain is not severe then you may take the non-prescription medication that you normally take for aches and pains  You may resume your regularly scheduled medications (unless otherwise directed)  If any side effects or adverse reactions occur, discontinue the medication and contact your doctor. Review the patient drug information that is provided before you take any medication    Ambulation and Activity:  You are advised to go directly home from the hospital  Use walker, knee scooter as needed  You may not put weight on the operated foot. Avoid stairs if possible. Do not lift or move heavy objects  Do not drive until cleared by Dr. Jennifer Wallace and Wound Care Instructions:  Keep bandage clean and dry  Do not shower or bathe the operative extremity  Do not remove the bandage (unless otherwise directed)  Do not attempt to put anything between the cast or dressing and your skin, some itching is normal.    Ice and Elevation:  Elevate operative extremity as much as possible to reduce swelling and discomfort. Elevate with 2 pillows at or above the level of the heart for the first 72 hours. Ice:  [facility] dispensed insulated ice bag over the bandage 20 minutes of every hour while awake for the first 72 hours. You may behind the knee as well. Special Instructions: Call your doctor immediately if you develop any of the following. Fever over 100 degrees by mouth - take your temperature daily until your first follow up visit.   Pain not relieved by medication ordered  Swelling, increased redness, warmth, or hardness around operative area. Numb, tingling or cold toes. Toe(s) become white or bluish  Bandage becomes wet, soiled, or blood soaked (small amount of bleeding may be normal)  Increased or progressive drainage from surgical area. Follow up instructions: Your first post operative appointment is scheduled for Tuesday    Call Dr. Mendez Joshi office if you have any questions or concerns. DISCHARGE SUMMARY from your Nurse    The following personal items collected during your admission are returned to you:   Dental Appliance: Dental Appliances: Partials, Uppers, At home  Vision: Visual Aid: Glasses, With patient  Hearing Aid:    Jewelry: Jewelry: None  Clothing: Clothing: Undergarments, Shirt, Footwear, Shorts(in PACU)  Other Valuables: Other Valuables: Eyeglasses(with pt belongings)  Valuables sent to safe:      PATIENT INSTRUCTIONS:    After general anesthesia or intravenous sedation, for 24 hours or while taking prescription Narcotics:  · Limit your activities  · Do not drive and operate hazardous machinery  · Do not make important personal or business decisions  · Do  not drink alcoholic beverages  · If you have not urinated within 8 hours after discharge, please contact your surgeon on call. Report the following to your surgeon:  · Excessive pain, swelling, redness or odor of or around the surgical area  · Temperature over 100.5  · Nausea and vomiting lasting longer than 4 hours or if unable to take medications  · Any signs of decreased circulation or nerve impairment to extremity: change in color, persistent  numbness, tingling, coldness or increase pain  · Any questions    COUGH AND DEEP BREATHE    Breathing deep and coughing are very important exercises to do after surgery. Deep breathing and coughing open the little air tubes and air sacks in your lungs. You take deep breaths every day.   You may not even notice - it is just something you do when you sigh or yawn. It is a natural exercise you do to keep these air passages open. After surgery, take deep breaths and cough, on purpose. Coughing and deep breathing help prevent bronchitis and pneumonia after surgery. If you had chest or belly surgery, use a pillow as a \"hug nguyễn\" and hold it tightly to your chest or belly when you cough. DIRECTIONS:  · Take 10 to 15 slow deep breaths every hour while awake. · Breathe in deeply, and hold it for 2 seconds. · Exhale slowly through puckered lips, like blowing up a balloon. · After every 4th or 5th deep breath, hug your pillow to your chest or belly and give a hard, deep cough. Yes, it will probably hurt. But doing this exercise is very important part of healing after surgery. Take your pain medicine to help you do this exercise without too much pain. IF YOU HAVE BEEN DIAGNOSED WITH SLEEP APNEA, PLEASE USE YOUR SLEEP APNEA DEVICE OR CPAP MACHINE WHEN YOU INTEND TO NAP AFTER TAKING PAIN MEDICATION. Ankle Pumps    Ankle pumps increase the circulation of oxygenated blood to your lower extremities and decrease your risk for circulation problems such as blood clots. They also stretch the muscles, tendons and ligaments in your foot and ankle, and prevent joint contracture in the ankle and foot, especially after surgeries on the legs. It is important to do ankle pump exercises regularly after surgery because immobility increases your risk for developing a blood clot. Your doctor may also have you take an Aspirin for the next few days as well. If your doctor did not ask you to take an Aspirin, consult with him before starting Aspirin therapy on your own. Slowly point your foot forward, feeling the muscles on the top of your lower leg stretch, and hold this position for 5 seconds.                   Next, pull your foot back toward you as far as possible, stretching the calf muscles, and hold that position for 5 seconds. Repeat with the other foot. Perform 10 repetitions every hour while awake for both ankles if possible (down and then up with the foot once is one repetition). You should feel gentle stretching of the muscles in your lower leg when doing this exercise. If you feel pain, or your range of motion is limited, don't  Push too hard. Only go the limit your joint and muscles will let you go. If you have increasing pain, progressively worsening leg warmth or swelling, STOP the exercise and call your doctor. Below is information about the medications your doctor is prescribing after your visit:    Other information in your discharge envelope:  []     PRESCRIPTIONS  []     SCHOOL/WORK NOTE  []     INFECTION PREVENTION  []     REGIONAL NERVE BLOCK PAMPHLET  [x]     REGIONAL NERVE BLOCK ON QUE PAMPHLET   []     EXPAREL  []     HANDICAP APPLICATION         These are general instructions for a healthy lifestyle:    *  Please give a list of your current medications to your Primary Care Provider. *  Please update this list whenever your medications are discontinued, doses are      changed, or new medications (including over-the-counter products) are added. *  Please carry medication information at all times in case of emergency situations. About Smoking  No smoking / No tobacco products / Avoid exposure to second hand smoke    Surgeon General's Warning:  Quitting smoking now greatly reduces serious risk to your health. Obesity, smoking, and sedentary lifestyle greatly increases your risk for illness and disease. A healthy diet, regular physical exercise & weight monitoring are important for maintaining a healthy lifestyle.     Congestive Heart Failure  You may be retaining fluid if you have a history of heart failure or if you experience any of the following symptoms:  Weight gain of 3 pounds or more overnight or 5 pounds in a week, increased swelling in our hands or feet or shortness of breath while lying flat in bed. Please call your doctor as soon as you notice any of these symptoms; do not wait until your next office visit. Recognize signs and symptoms of STROKE:  F - face looks uneven  A - arms unable to move or move even  S - speech slurred or non-existent  T - time-call 911 as soon as signs and symptoms begin-DO NOT go         Back to bed or wait to see if you get better-TIME IS BRAIN. Warning signs of HEART ATTACK  Call 911 if you have these symptoms    · Chest discomfort. Most heart attacks involve discomfort in the center of the chest that lasts more than a few minutes, or that goes away and comes back. It can feel like uncomfortable pressure, squeezing, fullness, or pain. · Discomfort in other areas of the upper body. Symptoms can include pain or discomfort in one or both        Arms, the back, neck, jaw, or stomach. ·  Shortness of breath with or without chest discomfort. · Other signs may include breaking out in a cold sweat, nausea, or lightheadedness    Don't wait more than five minutes to call 911 - MINUTES MATTER! Fast action can save your life. Calling 911 is almost always the fastest way to get lifesaving treatment. Emergency Medical Services staff can begin treatment when they arrive - up to an hour sooner than if someone gets to the hospital by car.

## 2020-06-13 NOTE — ANESTHESIA PREPROCEDURE EVALUATION
Relevant Problems   No relevant active problems       Anesthetic History   No history of anesthetic complications            Review of Systems / Medical History  Patient summary reviewed, nursing notes reviewed and pertinent labs reviewed    Pulmonary  Within defined limits                 Neuro/Psych   Within defined limits           Cardiovascular  Within defined limits  Hypertension                   GI/Hepatic/Renal  Within defined limits              Endo/Other  Within defined limits      Arthritis     Other Findings              Physical Exam    Airway  Mallampati: II  TM Distance: 4 - 6 cm  Neck ROM: normal range of motion   Mouth opening: Normal     Cardiovascular    Rhythm: regular  Rate: normal         Dental  No notable dental hx       Pulmonary  Breath sounds clear to auscultation               Abdominal         Other Findings            Anesthetic Plan    ASA: 2  Anesthesia type: MAC      Post-op pain plan if not by surgeon: peripheral nerve block single      Anesthetic plan and risks discussed with: Patient

## 2020-06-13 NOTE — ANESTHESIA POSTPROCEDURE EVALUATION
Procedure(s):  FUSION FIRST METATARSOPHALANGEAL JOINT BONE RIGHT FOOT, BONE AUTOGRAFT RIGHT FOOT, PARTIAL EXCISION OF BONE 4TH TOE RIGHT FOOT, HAMMERTOE REPAIR FIFTH TOE RIGHT FOOT (MAC/POP/SAPENOUS WITH CATHETER). MAC    Anesthesia Post Evaluation        Patient location during evaluation: bedside  Level of consciousness: awake  Pain management: satisfactory to patient  Airway patency: patent  Anesthetic complications: no  Cardiovascular status: acceptable  Respiratory status: acceptable  Hydration status: acceptable        INITIAL Post-op Vital signs:   Vitals Value Taken Time   /66 6/13/2020 12:08 PM   Temp 36.1 °C (96.9 °F) 6/13/2020 12:08 PM   Pulse 59 6/13/2020 12:09 PM   Resp 12 6/13/2020 12:09 PM   SpO2 100 % 6/13/2020 12:09 PM   Vitals shown include unvalidated device data.

## 2021-03-31 ENCOUNTER — HOSPITAL ENCOUNTER (OUTPATIENT)
Dept: PREADMISSION TESTING | Age: 61
Discharge: HOME | End: 2021-03-31
Attending: PODIATRIST
Payer: MEDICAID

## 2021-03-31 VITALS
SYSTOLIC BLOOD PRESSURE: 157 MMHG | DIASTOLIC BLOOD PRESSURE: 97 MMHG | HEART RATE: 100 BPM | RESPIRATION RATE: 10 BRPM | OXYGEN SATURATION: 97 % | TEMPERATURE: 98.9 F | HEIGHT: 70 IN | BODY MASS INDEX: 28.78 KG/M2 | WEIGHT: 201.06 LBS

## 2021-03-31 LAB
ANION GAP SERPL CALC-SCNC: 6 MMOL/L (ref 5–15)
ATRIAL RATE: 99 BPM
BUN SERPL-MCNC: 7 MG/DL (ref 6–20)
BUN/CREAT SERPL: 9 (ref 12–20)
CALCIUM SERPL-MCNC: 9.5 MG/DL (ref 8.5–10.1)
CALCULATED P AXIS, ECG09: 72 DEGREES
CALCULATED R AXIS, ECG10: 77 DEGREES
CALCULATED T AXIS, ECG11: 84 DEGREES
CHLORIDE SERPL-SCNC: 101 MMOL/L (ref 97–108)
CO2 SERPL-SCNC: 28 MMOL/L (ref 21–32)
CREAT SERPL-MCNC: 0.76 MG/DL (ref 0.7–1.3)
DIAGNOSIS, 93000: NORMAL
GLUCOSE SERPL-MCNC: 87 MG/DL (ref 65–100)
P-R INTERVAL, ECG05: 130 MS
POTASSIUM SERPL-SCNC: 4.3 MMOL/L (ref 3.5–5.1)
Q-T INTERVAL, ECG07: 338 MS
QRS DURATION, ECG06: 92 MS
QTC CALCULATION (BEZET), ECG08: 433 MS
SODIUM SERPL-SCNC: 135 MMOL/L (ref 136–145)
VENTRICULAR RATE, ECG03: 99 BPM

## 2021-03-31 PROCEDURE — 80048 BASIC METABOLIC PNL TOTAL CA: CPT

## 2021-03-31 PROCEDURE — 93005 ELECTROCARDIOGRAM TRACING: CPT

## 2021-03-31 PROCEDURE — 36415 COLL VENOUS BLD VENIPUNCTURE: CPT

## 2021-03-31 RX ORDER — IBUPROFEN 200 MG
600 TABLET ORAL
COMMUNITY

## 2021-03-31 NOTE — PERIOP NOTES
[address] [address] [facility]   MAIN OR                                  [phone]   MAIN PRE OP                          [phone]                                                                                AMBULATORY PRE OP          [phone]  PRE-ADMISSION TESTING    [phone]   Surgery Date: Friday, April 9        Is surgery arrival time given by surgeon? NO  If NO, [facility] staff will call you between 3 and 7pm the day before your surgery with your arrival time. (If your surgery is on a Monday, we will call you the Friday before.)    Call [phone] after 7pm Monday-Friday if you did not receive this call. INSTRUCTIONS BEFORE YOUR SURGERY   When You  Arrive Arrive at the [address] [facility] on the day of your surgery  Have your insurance card, photo ID, and any copayment (if needed)   Food   and   Drink NO food or drink after midnight the night before surgery    This means NO water, gum, mints, coffee, juice, etc.  No alcohol (beer, wine, liquor) 24 hours before and after surgery   Medications to   TAKE   Morning of Surgery MEDICATIONS TO TAKE THE MORNING OF SURGERY WITH A SIP OF WATER:    Amlodipine   Diazepam   Effexor   Medications  To  STOP      7 days before surgery  Non-Steroidal anti-inflammatory Drugs (NSAID's): for example, Ibuprofen (Advil, Motrin), Naproxen (Aleve)   Aspirin, if taking for pain    Herbal supplements, vitamins, and fish oil   Other:  Stop Viagra 24 hours before DOS  (Pain medications not listed above, including Tylenol may be taken)   Blood  Thinners  If you take  Aspirin, Plavix, Coumadin, or any blood-thinning or anti-blood clot medicine, talk to the doctor who prescribed the medications for pre-operative instructions.    Bathing Clothing  Jewelry  Valuables      If you shower the morning of surgery, please do not apply anything to your skin (lotions, powders, deodorant, or makeup, especially nelson)   Follow Chlorhexidine Care Fusion body wash instructions provided to you during PAT appointment. Begin 3 days prior to surgery.  Do not shave or trim anywhere 24 hours before surgery   Wear your hair loose or down; no pony-tails, buns, or metal hair clips   Wear loose, comfortable, clean clothes   Wear glasses instead of contacts   Leave money, valuables, and jewelry, including body piercings, at home   Going Home - or Spending the Night  SAME-DAY SURGERY: You must have a responsible adult drive you home and stay with you 24 hours after surgery    Hospital discharge time is 12 noon  Drivers must be here before 12 noon unless you are told differently   Special Instructions It is now mandated that all surgical patients be tested for COVID-19 prior to surgery. Testing has to be exactly 4 days prior to surgery. Your COVID test date is Monday, April 5 between 8:00 am and 11:00 am.       COVID testing will be performed curbside at the [address] [place] [address] Dr wagner. There will be signs leading you to the testing site. You will need to bring a photo ID with you to be swabbed. Patients are advised to self-quarantine at home after testing and prior to your surgery date. You will be notified if your results are positive. What to watch for:   Coronavirus (COVID-19) affects different people in different ways   It also appears with a wide range of symptoms from mild to severe   Signs usually appear 2-14 days after exposure     If you develop any of the following, notify your doctor immediately:  o Fever  o Chills, with or without a shiver  o Muscle pain  o Headache  o Sore throat  o Dry cough  o New loss of taste or smell  o Tiredness      If you develop any of the following, call 911:  o Shortness of breath  o Difficulty breathing  o Chest pain  o New confusion  o Blueness of fingers and/or lips     Follow all instructions so your surgery wont be cancelled. Please, be on time. If a situation occurs and you are delayed the day of surgery, call [phone] or [phone]. If your physical condition changes (like a fever, cold, flu, etc.) call your surgeon. Home medication(s) reviewed and verified via  LIST   VERBAL   during PAT appointment. The patient was contacted by  IN-PERSON  The patient verbalizes understanding of all instructions and  DOES NOT   need reinforcement.

## 2021-04-01 NOTE — H&P (VIEW-ONLY)
History and Physical 
 
Patient: Prachi Medina MRN: [id number]  SSN: [id number] YOB: 1960  Age: 63 y.o. Sex: male Subjective:  
  
Prachi Medina is a 63 y.o. male who will be undergoing left foot surgery with Dr. Keli Dash on 21. Melanie Ordoñez notes he had a left bunion surgery many years ago and the hardware is causing pain. 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 He had his right foot taken care of in  of last year and notes he did great. Past Medical History:  
Diagnosis Date  Anxiety  Hx of migraines 2018  Hypertension  Insomnia disorder  Orbital fracture ([address] 2018 Right eye from fall  PTSD (post-traumatic stress disorder)  Tinnitus Past Surgical History:  
Procedure Laterality Date  HX BUNIONECTOMY Bilateral   HX COLONOSCOPY  2015  HX HAMMER TOE REPAIR Right 2020  HX SEPTOPLASTY  [id number] Family History Problem Relation Age of Onset  Anesth Problems Neg Hx  Deep Vein Thrombosis Neg Hx Social History Tobacco Use  Smoking status: Former Smoker Packs/day: 0.50 Types: Cigarettes Quit date: 2020 Years since quittin.8  Smokeless tobacco: Never Used  Tobacco comment: Stopped for surgery- several weeks ago Substance Use Topics  Alcohol use: Yes Alcohol/week: 6.0 standard drinks Types: 6 Glasses of wine per week  Drug use: Yes Frequency: 3.0 times per week Types: Marijuana Prior to Admission medications Medication Sig Start Date End Date Taking? Authorizing Provider  
ibuprofen (Motrin IB) 200 mg tablet Take 600 mg by mouth two (2) times daily as needed for Pain. Yes Provider, Historical  
diazePAM (Valium) 5 mg tablet Take 5 mg by mouth Every morning. Yes Provider, Historical  
amLODIPine (NORVASC) 5 mg tablet Take 5 mg by mouth every morning.    Yes Provider, Historical  
traZODone (DESYREL) 100 mg tablet Take 100 mg by mouth nightly. Yes Provider, Historical  
sildenafil citrate (Viagra) 50 mg tablet Take 50 mg by mouth daily as needed for Erectile Dysfunction. Yes Provider, Historical  
venlafaxine (EFFEXOR) 37.5 mg tablet Take 75 mg by mouth Every morning. Yes Provider, Historical  
OTHER Take 1 Package by mouth daily. Vitamin Pack   Yes Provider, Historical  
  
 
No Known Allergies Review of Systems: 
Constitutional: Negative for chills and fever HENT: Negative for congestion and sore throat Eyes: negative for blurred vision and double vision Respiratory: Negative for cough, shortness of breath and wheezing Mouth: Negative for loose, broken or chipped teeth. Cardiovascular: Negative for chest pain and palpitations Gastrointestinal: Negative for abdominal pain, constipation, diarrhea and nausea Genitourinary: Negative for dysuria and hematuria Musculoskeletal: Left foot pain Skin: Negative for rash, open wounds. Negative for bruises easily Neurological: Negative for dizziness, tremors and headaches Psychiatric: Negative for depression. The patient is not nervous/anxious. Objective:  
 
Vitals:  
 03/31/21 2558 BP: (!) 157/97 Pulse: 100 Resp: 10 Temp: 98.9 °F (37.2 °C) SpO2: 97% Weight: 91.2 kg (201 lb 1 oz) Height: 5' 10\" (1.778 m) Physical Exam: 
Constitutional:  Appears well,  No Acute Distress, Vitals noted Psychiatric:   Affect normal, Alert and Oriented to person/place/time Eyes:   Pupils equally round and reactive, EOMI, conjunctiva clear, eyelids normal 
ENT:   External ears and nose normal, teeth normal, gums normal, TMs and Orophyarynx normal 
Neck:   General inspection and Thyroid normal.  No abnormal cervical or supraclavicular nodes Lungs:   Clear to auscultation, good respiratory effort Heart: Ausculation normal.  Regular rhythm. No cardiac murmurs. No carotid bruits or palpable thrills Chest wall normal 
Musculoskeletal: Normal gait Extremities:   Without edema, good peripheral pulses Skin:   Warm to palpation, without rashes, bruising, or suspicious lesions Recent Results (from the past 72 hour(s)) METABOLIC PANEL, BASIC Collection Time: 03/31/21  9:16 AM  
Result Value Ref Range Sodium 135 (L) 136 - 145 mmol/L Potassium 4.3 3.5 - 5.1 mmol/L Chloride 101 97 - 108 mmol/L  
 CO2 28 21 - 32 mmol/L Anion gap 6 5 - 15 mmol/L Glucose 87 65 - 100 mg/dL BUN 7 6 - 20 MG/DL Creatinine 0.76 0.70 - 1.30 MG/DL  
 BUN/Creatinine ratio 9 (L) 12 - 20 GFR est AA >60 >60 ml/min/1.73m2 GFR est non-AA >60 >60 ml/min/1.73m2 Calcium 9.5 8.5 - 10.1 MG/DL  
EKG, 12 LEAD, INITIAL Collection Time: 03/31/21  9:37 AM  
Result Value Ref Range Ventricular Rate 99 BPM  
 Atrial Rate 99 BPM  
 P-R Interval 130 ms QRS Duration 92 ms Q-T Interval 338 ms QTC Calculation (Bezet) 433 ms Calculated P Axis 72 degrees Calculated R Axis 77 degrees Calculated T Axis 84 degrees Diagnosis Normal sinus rhythm Normal ECG When compared with ECG of 09-JUN-2020 12:01, No significant change was found Confirmed by Derian Hoover MD., Jorge ([id number]) on 3/31/2021 8:44:25 PM 
  
 
 
 
Assessment:  
 
Left foot pain Plan:  
 
FUSION 1ST METATARSAL PHALANGEAL JOINT LEFT FOOT, REMOVAL OF HARDWARE LEFT, 2ND METATARSAL OSTEOTOMY LEFT FOOTM CORRECTION OF 2ND TOE LEFT FOOT, BONE AUTO GRAFT LEFT FOOT  
 
BMP and EKG reviewed Signed By: Ysabel Mayer NP April 1, 2021

## 2021-04-01 NOTE — H&P
History and Physical    Patient: Tolu White MRN: [id number]  SSN: [id number]    YOB: 1960  Age: 63 y.o. Sex: male      Subjective:      Tolu White is a 63 y.o. male who will be undergoing left foot surgery with Dr. Saira Stewart on 21. [place] De La Paz notes he had a left bunion surgery many years ago and the hardware is causing pain. 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 He had his right foot taken care of in  of last year and notes he did great. Past Medical History:   Diagnosis Date    Anxiety     Hx of migraines 2018    Hypertension     Insomnia disorder     Orbital fracture ([address] 2018    Right eye from fall    PTSD (post-traumatic stress disorder)     Tinnitus      Past Surgical History:   Procedure Laterality Date    HX BUNIONECTOMY Bilateral 2006    HX COLONOSCOPY  2015    HX HAMMER TOE REPAIR Right 2020    HX SEPTOPLASTY  1975      Family History   Problem Relation Age of Onset    Anesth Problems Neg Hx     Deep Vein Thrombosis Neg Hx      Social History     Tobacco Use    Smoking status: Former Smoker     Packs/day: 0.50     Types: Cigarettes     Quit date: 2020     Years since quittin.8    Smokeless tobacco: Never Used    Tobacco comment: Stopped for surgery- several weeks ago   Substance Use Topics    Alcohol use: Yes     Alcohol/week: 6.0 standard drinks     Types: 6 Glasses of wine per week    Drug use: Yes     Frequency: 3.0 times per week     Types: Marijuana      Prior to Admission medications    Medication Sig Start Date End Date Taking? Authorizing Provider   ibuprofen (Motrin IB) 200 mg tablet Take 600 mg by mouth two (2) times daily as needed for Pain. Yes Provider, Historical   diazePAM (Valium) 5 mg tablet Take 5 mg by mouth Every morning. Yes Provider, Historical   amLODIPine (NORVASC) 5 mg tablet Take 5 mg by mouth every morning.    Yes Provider, Historical   traZODone (DESYREL) 100 mg tablet Take 100 mg by mouth nightly. Yes Provider, Historical   sildenafil citrate (Viagra) 50 mg tablet Take 50 mg by mouth daily as needed for Erectile Dysfunction. Yes Provider, Historical   venlafaxine (EFFEXOR) 37.5 mg tablet Take 75 mg by mouth Every morning. Yes Provider, Historical   OTHER Take 1 Package by mouth daily. Vitamin Pack   Yes Provider, Historical        No Known Allergies    Review of Systems:  Constitutional: Negative for chills and fever  HENT: Negative for congestion and sore throat  Eyes: negative for blurred vision and double vision  Respiratory: Negative for cough, shortness of breath and wheezing  Mouth: Negative for loose, broken or chipped teeth. Cardiovascular: Negative for chest pain and palpitations  Gastrointestinal: Negative for abdominal pain, constipation, diarrhea and nausea  Genitourinary: Negative for dysuria and hematuria  Musculoskeletal: Left foot pain  Skin: Negative for rash, open wounds. Negative for bruises easily  Neurological: Negative for dizziness, tremors and headaches  Psychiatric: Negative for depression. The patient is not nervous/anxious. Objective:     Vitals:    03/31/21 0821   BP: (!) 157/97   Pulse: 100   Resp: 10   Temp: 98.9 °F (37.2 °C)   SpO2: 97%   Weight: 91.2 kg (201 lb 1 oz)   Height: 5' 10\" (1.778 m)        Physical Exam:  Constitutional:  Appears well,  No Acute Distress, Vitals noted  Psychiatric:   Affect normal, Alert and Oriented to person/place/time    Eyes:   Pupils equally round and reactive, EOMI, conjunctiva clear, eyelids normal  ENT:   External ears and nose normal, teeth normal, gums normal, TMs and Orophyarynx normal  Neck:   General inspection and Thyroid normal.  No abnormal cervical or supraclavicular nodes    Lungs:   Clear to auscultation, good respiratory effort  Heart: Ausculation normal.  Regular rhythm. No cardiac murmurs.   No carotid bruits or palpable thrills  Chest wall normal  Musculoskeletal: Normal gait  Extremities:   Without edema, good peripheral pulses  Skin:   Warm to palpation, without rashes, bruising, or suspicious lesions     Recent Results (from the past 72 hour(s))   METABOLIC PANEL, BASIC    Collection Time: 03/31/21  9:16 AM   Result Value Ref Range    Sodium 135 (L) 136 - 145 mmol/L    Potassium 4.3 3.5 - 5.1 mmol/L    Chloride 101 97 - 108 mmol/L    CO2 28 21 - 32 mmol/L    Anion gap 6 5 - 15 mmol/L    Glucose 87 65 - 100 mg/dL    BUN 7 6 - 20 MG/DL    Creatinine 0.76 0.70 - 1.30 MG/DL    BUN/Creatinine ratio 9 (L) 12 - 20      GFR est AA >60 >60 ml/min/1.73m2    GFR est non-AA >60 >60 ml/min/1.73m2    Calcium 9.5 8.5 - 10.1 MG/DL   EKG, 12 LEAD, INITIAL    Collection Time: 03/31/21  9:37 AM   Result Value Ref Range    Ventricular Rate 99 BPM    Atrial Rate 99 BPM    P-R Interval 130 ms    QRS Duration 92 ms    Q-T Interval 338 ms    QTC Calculation (Bezet) 433 ms    Calculated P Axis 72 degrees    Calculated R Axis 77 degrees    Calculated T Axis 84 degrees    Diagnosis       Normal sinus rhythm  Normal ECG  When compared with ECG of 09-JUN-2020 12:01,  No significant change was found  Confirmed by Laura Stroud MD., Jorge ([id number]) on 3/31/2021 8:44:25 PM           Assessment:     Left foot pain    Plan:     FUSION 1ST METATARSAL PHALANGEAL JOINT LEFT FOOT, REMOVAL OF HARDWARE LEFT, 2ND METATARSAL OSTEOTOMY LEFT FOOTM CORRECTION OF 2ND TOE LEFT FOOT, BONE AUTO GRAFT LEFT FOOT     BMP and EKG reviewed    Signed By: Guillermina Galvan NP     April 1, 2021

## 2021-04-05 ENCOUNTER — HOSPITAL ENCOUNTER (OUTPATIENT)
Dept: PREADMISSION TESTING | Age: 61
Discharge: HOME | End: 2021-04-05
Payer: MEDICAID

## 2021-04-05 PROCEDURE — U0005 INFEC AGEN DETEC AMPLI PROBE: HCPCS

## 2021-04-06 LAB — SARS-COV-2, COV2NT: NOT DETECTED

## 2021-04-08 ENCOUNTER — ANESTHESIA EVENT (OUTPATIENT)
Dept: SURGERY | Age: 61
End: 2021-04-08
Payer: MEDICAID

## 2021-04-09 ENCOUNTER — APPOINTMENT (OUTPATIENT)
Dept: GENERAL RADIOLOGY | Age: 61
End: 2021-04-09
Attending: PODIATRIST
Payer: MEDICAID

## 2021-04-09 ENCOUNTER — ANESTHESIA (OUTPATIENT)
Dept: SURGERY | Age: 61
End: 2021-04-09
Payer: MEDICAID

## 2021-04-09 ENCOUNTER — HOSPITAL ENCOUNTER (OUTPATIENT)
Age: 61
Setting detail: OUTPATIENT SURGERY
Discharge: HOME | End: 2021-04-09
Attending: PODIATRIST | Admitting: PODIATRIST
Payer: MEDICAID

## 2021-04-09 VITALS
BODY MASS INDEX: 28.78 KG/M2 | TEMPERATURE: 97.1 F | SYSTOLIC BLOOD PRESSURE: 145 MMHG | RESPIRATION RATE: 13 BRPM | WEIGHT: 201.06 LBS | HEIGHT: 70 IN | DIASTOLIC BLOOD PRESSURE: 86 MMHG | OXYGEN SATURATION: 94 % | HEART RATE: 68 BPM

## 2021-04-09 PROBLEM — M20.5X2 CROSSOVER TOE DEFORMITY OF LEFT FOOT: Status: ACTIVE | Noted: 2021-04-09

## 2021-04-09 PROBLEM — M20.21 HALLUX RIGIDUS OF RIGHT FOOT: Status: RESOLVED | Noted: 2020-06-13 | Resolved: 2021-04-09

## 2021-04-09 PROBLEM — M79.672 FOOT PAIN, LEFT: Status: ACTIVE | Noted: 2021-04-09

## 2021-04-09 PROBLEM — M20.41 HAMMERTOE OF RIGHT FOOT: Status: RESOLVED | Noted: 2020-06-13 | Resolved: 2021-04-09

## 2021-04-09 PROBLEM — M79.671 RIGHT FOOT PAIN: Status: RESOLVED | Noted: 2020-06-13 | Resolved: 2021-04-09

## 2021-04-09 PROBLEM — M77.42 METATARSALGIA OF LEFT FOOT: Status: ACTIVE | Noted: 2021-04-09

## 2021-04-09 PROBLEM — M94.272 CHONDROMALACIA, LEFT ANKLE AND JOINTS OF LEFT FOOT: Status: ACTIVE | Noted: 2020-06-13

## 2021-04-09 PROBLEM — M77.51 BONE SPUR OF TOE OF RIGHT FOOT: Status: RESOLVED | Noted: 2020-06-13 | Resolved: 2021-04-09

## 2021-04-09 PROBLEM — M94.271 CHONDROMALACIA, RIGHT ANKLE AND JOINTS OF RIGHT FOOT: Status: RESOLVED | Noted: 2020-06-13 | Resolved: 2021-04-09

## 2021-04-09 PROBLEM — M20.22 HALLUX RIGIDUS OF LEFT FOOT: Status: ACTIVE | Noted: 2020-06-13

## 2021-04-09 PROCEDURE — 64445 NJX AA&/STRD SCIATIC NRV IMG: CPT | Performed by: ANESTHESIOLOGY

## 2021-04-09 PROCEDURE — 2709999900 HC NON-CHARGEABLE SUPPLY: Performed by: PODIATRIST

## 2021-04-09 PROCEDURE — 74011250636 HC RX REV CODE- 250/636: Performed by: NURSE ANESTHETIST, CERTIFIED REGISTERED

## 2021-04-09 PROCEDURE — 77030010507 HC ADH SKN DERMBND J&J -B: Performed by: ANESTHESIOLOGY

## 2021-04-09 PROCEDURE — 77030040361 HC SLV COMPR DVT MDII -B

## 2021-04-09 PROCEDURE — 77030031139 HC SUT VCRL2 J&J -A: Performed by: PODIATRIST

## 2021-04-09 PROCEDURE — 77030013837 HC NERV BLK KT BBMI -B: Performed by: ANESTHESIOLOGY

## 2021-04-09 PROCEDURE — 77030003747: Performed by: PODIATRIST

## 2021-04-09 PROCEDURE — 73620 X-RAY EXAM OF FOOT: CPT

## 2021-04-09 PROCEDURE — 74011250637 HC RX REV CODE- 250/637: Performed by: ANESTHESIOLOGY

## 2021-04-09 PROCEDURE — 74011250636 HC RX REV CODE- 250/636: Performed by: ANESTHESIOLOGY

## 2021-04-09 PROCEDURE — 74011000250 HC RX REV CODE- 250: Performed by: ANESTHESIOLOGY

## 2021-04-09 PROCEDURE — 76210000020 HC REC RM PH II FIRST 0.5 HR: Performed by: PODIATRIST

## 2021-04-09 PROCEDURE — C1713 ANCHOR/SCREW BN/BN,TIS/BN: HCPCS | Performed by: PODIATRIST

## 2021-04-09 PROCEDURE — 74011250636 HC RX REV CODE- 250/636: Performed by: PODIATRIST

## 2021-04-09 PROCEDURE — 77030020269 HC MISC IMPL: Performed by: PODIATRIST

## 2021-04-09 PROCEDURE — 77030002916 HC SUT ETHLN J&J -A: Performed by: PODIATRIST

## 2021-04-09 PROCEDURE — 76210000016 HC OR PH I REC 1 TO 1.5 HR: Performed by: PODIATRIST

## 2021-04-09 PROCEDURE — 77030000032 HC CUF TRNQT ZIMM -B: Performed by: PODIATRIST

## 2021-04-09 PROCEDURE — 76010000131 HC OR TIME 2 TO 2.5 HR: Performed by: PODIATRIST

## 2021-04-09 PROCEDURE — 77030018836 HC SOL IRR NACL ICUM -A: Performed by: PODIATRIST

## 2021-04-09 PROCEDURE — 77030003601 HC NDL NRV BLK BBMI -A: Performed by: ANESTHESIOLOGY

## 2021-04-09 PROCEDURE — 77030020268 HC MISC GENERAL SUPPLY: Performed by: PODIATRIST

## 2021-04-09 PROCEDURE — 76060000035 HC ANESTHESIA 2 TO 2.5 HR: Performed by: PODIATRIST

## 2021-04-09 PROCEDURE — 74011000250 HC RX REV CODE- 250: Performed by: PODIATRIST

## 2021-04-09 PROCEDURE — 74011000250 HC RX REV CODE- 250: Performed by: NURSE ANESTHETIST, CERTIFIED REGISTERED

## 2021-04-09 PROCEDURE — 74011250636 HC RX REV CODE- 250/636

## 2021-04-09 PROCEDURE — 77030040922 HC BLNKT HYPOTHRM STRY -A

## 2021-04-09 DEVICE — SCREW BNE L13MM DIA2MM CORT FT ANK TI SELF DRL ST SLD FULL: Type: IMPLANTABLE DEVICE | Site: FOOT | Status: FUNCTIONAL

## 2021-04-09 DEVICE — Z DUP USE 2641788 GRAFT BNE PTTY 2.5 CC DBM OSTEOSPARX: Type: IMPLANTABLE DEVICE | Site: FOOT | Status: FUNCTIONAL

## 2021-04-09 DEVICE — SCREW BNE L16MM DIA3MM FT ANK TI VAR ANG LOK LO PROF FOR: Type: IMPLANTABLE DEVICE | Site: FOOT | Status: FUNCTIONAL

## 2021-04-09 DEVICE — SYSTEM IMPL FOREFOOT PEEK W/ 3X8MM TENODESIS SCR OBLONG: Type: IMPLANTABLE DEVICE | Site: FOOT | Status: FUNCTIONAL

## 2021-04-09 RX ORDER — NALOXONE HYDROCHLORIDE 0.4 MG/ML
0.2 INJECTION, SOLUTION INTRAMUSCULAR; INTRAVENOUS; SUBCUTANEOUS
Status: DISCONTINUED | OUTPATIENT
Start: 2021-04-09 | End: 2021-04-09 | Stop reason: HOSPADM

## 2021-04-09 RX ORDER — ONDANSETRON 2 MG/ML
INJECTION INTRAMUSCULAR; INTRAVENOUS AS NEEDED
Status: DISCONTINUED | OUTPATIENT
Start: 2021-04-09 | End: 2021-04-09 | Stop reason: HOSPADM

## 2021-04-09 RX ORDER — PROPOFOL 10 MG/ML
INJECTION, EMULSION INTRAVENOUS
Status: DISCONTINUED | OUTPATIENT
Start: 2021-04-09 | End: 2021-04-09 | Stop reason: HOSPADM

## 2021-04-09 RX ORDER — LIDOCAINE HYDROCHLORIDE 20 MG/ML
INJECTION, SOLUTION INFILTRATION; PERINEURAL AS NEEDED
Status: DISCONTINUED | OUTPATIENT
Start: 2021-04-09 | End: 2021-04-09 | Stop reason: HOSPADM

## 2021-04-09 RX ORDER — LIDOCAINE HYDROCHLORIDE 10 MG/ML
0.1 INJECTION, SOLUTION EPIDURAL; INFILTRATION; INTRACAUDAL; PERINEURAL AS NEEDED
Status: DISCONTINUED | OUTPATIENT
Start: 2021-04-09 | End: 2021-04-09 | Stop reason: HOSPADM

## 2021-04-09 RX ORDER — ROPIVACAINE HYDROCHLORIDE 5 MG/ML
INJECTION, SOLUTION EPIDURAL; INFILTRATION; PERINEURAL AS NEEDED
Status: DISCONTINUED | OUTPATIENT
Start: 2021-04-09 | End: 2021-04-09 | Stop reason: HOSPADM

## 2021-04-09 RX ORDER — FENTANYL CITRATE 50 UG/ML
INJECTION, SOLUTION INTRAMUSCULAR; INTRAVENOUS AS NEEDED
Status: DISCONTINUED | OUTPATIENT
Start: 2021-04-09 | End: 2021-04-09 | Stop reason: HOSPADM

## 2021-04-09 RX ORDER — GLYCOPYRROLATE 0.2 MG/ML
INJECTION INTRAMUSCULAR; INTRAVENOUS AS NEEDED
Status: DISCONTINUED | OUTPATIENT
Start: 2021-04-09 | End: 2021-04-09 | Stop reason: HOSPADM

## 2021-04-09 RX ORDER — BUPIVACAINE HYDROCHLORIDE 5 MG/ML
INJECTION, SOLUTION EPIDURAL; INTRACAUDAL AS NEEDED
Status: DISCONTINUED | OUTPATIENT
Start: 2021-04-09 | End: 2021-04-09 | Stop reason: HOSPADM

## 2021-04-09 RX ORDER — FLUMAZENIL 0.1 MG/ML
0.2 INJECTION INTRAVENOUS
Status: DISCONTINUED | OUTPATIENT
Start: 2021-04-09 | End: 2021-04-09 | Stop reason: HOSPADM

## 2021-04-09 RX ORDER — IBUPROFEN 600 MG/1
600 TABLET ORAL ONCE
Status: COMPLETED | OUTPATIENT
Start: 2021-04-09 | End: 2021-04-09

## 2021-04-09 RX ORDER — SODIUM CHLORIDE, SODIUM LACTATE, POTASSIUM CHLORIDE, CALCIUM CHLORIDE 600; 310; 30; 20 MG/100ML; MG/100ML; MG/100ML; MG/100ML
125 INJECTION, SOLUTION INTRAVENOUS CONTINUOUS
Status: DISCONTINUED | OUTPATIENT
Start: 2021-04-09 | End: 2021-04-09 | Stop reason: HOSPADM

## 2021-04-09 RX ORDER — DIPHENHYDRAMINE HYDROCHLORIDE 50 MG/ML
12.5 INJECTION, SOLUTION INTRAMUSCULAR; INTRAVENOUS AS NEEDED
Status: DISCONTINUED | OUTPATIENT
Start: 2021-04-09 | End: 2021-04-09 | Stop reason: HOSPADM

## 2021-04-09 RX ORDER — PROPOFOL 10 MG/ML
INJECTION, EMULSION INTRAVENOUS AS NEEDED
Status: DISCONTINUED | OUTPATIENT
Start: 2021-04-09 | End: 2021-04-09 | Stop reason: HOSPADM

## 2021-04-09 RX ORDER — HYDROMORPHONE HYDROCHLORIDE 1 MG/ML
.25-1 INJECTION, SOLUTION INTRAMUSCULAR; INTRAVENOUS; SUBCUTANEOUS
Status: DISCONTINUED | OUTPATIENT
Start: 2021-04-09 | End: 2021-04-09 | Stop reason: HOSPADM

## 2021-04-09 RX ORDER — MIDAZOLAM HYDROCHLORIDE 1 MG/ML
INJECTION, SOLUTION INTRAMUSCULAR; INTRAVENOUS AS NEEDED
Status: DISCONTINUED | OUTPATIENT
Start: 2021-04-09 | End: 2021-04-09 | Stop reason: HOSPADM

## 2021-04-09 RX ADMIN — GLYCOPYRROLATE 0.1 MG: 0.2 INJECTION INTRAMUSCULAR; INTRAVENOUS at 07:38

## 2021-04-09 RX ADMIN — PROPOFOL 20 MG: 10 INJECTION, EMULSION INTRAVENOUS at 07:35

## 2021-04-09 RX ADMIN — PROPOFOL 20 MG: 10 INJECTION, EMULSION INTRAVENOUS at 07:30

## 2021-04-09 RX ADMIN — BUPIVACAINE HYDROCHLORIDE 10 ML/HR: 7.5 INJECTION, SOLUTION EPIDURAL; RETROBULBAR at 10:13

## 2021-04-09 RX ADMIN — ROPIVACAINE HYDROCHLORIDE 30 ML: 5 INJECTION, SOLUTION EPIDURAL; INFILTRATION; PERINEURAL at 07:19

## 2021-04-09 RX ADMIN — CEFAZOLIN SODIUM 2 G: 1 POWDER, FOR SOLUTION INTRAMUSCULAR; INTRAVENOUS at 07:38

## 2021-04-09 RX ADMIN — IBUPROFEN 600 MG: 600 TABLET, FILM COATED ORAL at 10:25

## 2021-04-09 RX ADMIN — ONDANSETRON HYDROCHLORIDE 4 MG: 2 SOLUTION INTRAMUSCULAR; INTRAVENOUS at 07:38

## 2021-04-09 RX ADMIN — LIDOCAINE HYDROCHLORIDE 40 MG: 20 INJECTION, SOLUTION INFILTRATION; PERINEURAL at 07:29

## 2021-04-09 RX ADMIN — PROPOFOL 100 MCG/KG/MIN: 10 INJECTION, EMULSION INTRAVENOUS at 07:30

## 2021-04-09 RX ADMIN — SODIUM CHLORIDE, POTASSIUM CHLORIDE, SODIUM LACTATE AND CALCIUM CHLORIDE 125 ML/HR: 600; 310; 30; 20 INJECTION, SOLUTION INTRAVENOUS at 06:33

## 2021-04-09 RX ADMIN — ROPIVACAINE HYDROCHLORIDE 10 ML: 5 INJECTION, SOLUTION EPIDURAL; INFILTRATION; PERINEURAL at 07:27

## 2021-04-09 RX ADMIN — FENTANYL CITRATE 100 MCG: 0.05 INJECTION, SOLUTION INTRAMUSCULAR; INTRAVENOUS at 07:12

## 2021-04-09 RX ADMIN — MIDAZOLAM HYDROCHLORIDE 2 MG: 2 INJECTION, SOLUTION INTRAMUSCULAR; INTRAVENOUS at 07:12

## 2021-04-09 NOTE — OP NOTES
Operative Report    Patient: Alan Zavala MRN: [id number]  SSN: [id number]    YOB: 1960  Age: 63 y.o. 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 He previously had treatment of arthritis to the first metatarsophalangeal joint of the left foot with a joint replacement surgery. He continues to have pain to the joint as well as deformity. He also presented with concern about second toe pain and crossover second toe deformity which we attempted nonsurgical management with anti-inflammatory medications, ice, activity modification, shoe modifications, orthotics, as well as splinting of the toe without resolution of her symptoms.  Due to failed nonsurgical management I recommended surgical intervention with removal of the painful hardware, fusion first metatarsophalangeal joint with bone graft as well as correction second toe deformity with possible osteotomy of the second metatarsal. I discussed these procedures at length including the expected postoperative course, risks, alternatives, and possible occasions. No guarantees were given and the patient elected to proceed. Procedure in Detail: This 56-year-old male received a popliteal and saphenous block in preoperative holding. He was then brought back to the  room and play supine on the operating room table. After adequate IV sedation, the left lower strenuous prepped and draped in usual aseptic fashion. After placement of adequate padding at the ankle and exsanguination the limb a pneumatic ankle tourniquet was inflated to 225 mmHg. [place] of bone autograft: Attention was directed to the left lateral heel were an oblique incision was made a safe zone approximately one cent mirror in length. Incision was deepened using sharp and blunt dissection to expose the lateral wall of the calcaneus. Next a 4.5 mm traffic and was inserted in a dowel of bone was harvested and placed aside for later placement in the fusion site. This bone graft was then mixed with some putty for implantation. . The wound was then irrigated with copious monsoonal saline enclosed in layers. FUSION 1ST METATARSAL PHALANGEAL JOINT LEFT Irasema Bryson was then directed to the dorsal aspect of the first metatarsal phalangeal joint where there is noted brought and as of the first metatarsal as well as base of the proximal phalanx. There was a significant defect which measured approximately 1 cm so structural bone graft was necessary. A cervical spine structural allograft was obtained and placed in the defect. Each adjacent sides of the bone were viewed of all devitalize bone using a Rogeur and then fenestrated using a 2.0 drill. Next this was stabilized using temporary fixation with K wire. The previously harvested bone autograft was then implanted with the putty to help fill the defect from removing the implants.  Fusion was then performed using a dorsal plate, the Arthrex max force plate with compression applied manually. Fixation was checked using fluoroscopy and noted to be rectus alignment. REMOVAL OF HARDWARE LEFT FOOT, DEEP:Attention was directed to the left dorsal first metatarsophalangeal joint. A linear longitudinal incision was made medial and parallel to the extensor hallucis longest tendon approximately 5 cm in length. Incision was deepened using sharp and blunt dissection to expose the dorsal capsule as well as the extensor hallucis longus tendon. Next a capsular and periosteal incision was made medial and parallel to the extensor loose Ty's tendon. Soft tissue attachments were dissected free from the dorsal medial and lateral aspects of the first metatarsophalangeal joint. There was noted discoloration of the bone surfaces where the implants were present. The implants were loosened using a freer elevator and pastoring the field. There were two implants one for the metatarsal and one for the base of the proximal phalanx. 2ND METATARSAL OSTEOTOMY LEFT FOOT:Next attention was directed to the dorsal aspect of the second metatarsal. A dorsal linear incision was made approximate 2cm in length. This was deepened using sharp and blunt dissection with care taken to identify and retract vital neurovascular structures. Next a medial capsulotomy was performed to expose the head of the second metatarsal at the second metatarsophalangeal joint. Soft tissue attachments were resected sharply. Next collateral ligaments were released using an elevator. Next an oblique shortening metatarsal osteotomy was performed and fixated using a snap off screw from Arthrex. This was confirmed using fluoroscopy. Incision was irrigated with copious pencil saline and closed in layers. CORRECTION OF 2ND TOE LEFT FOOT: Attention next was directed to the crossover 2nd toe deformity of the left foot. Incision made over the distal 1/3 of the 2nd metatarsal shaft and plantar 2nd toe.  The flexor tendons to the 2nd toe were transected and transferred to the base of the proximal phalanx. The anchor inserted over the tendon was then attached to fibertape which was then passed plantar to the dtml and lateral to the 2nd mtpj extending to the shaft of the 2nd metatarsal. A hole was the drilled obliquely to allow for passage of the fibertape through. Tension was placed on the fibertape to pull the toe in a plantar and lateral direction to correct the deformity at the mtpj. This was confirmed using fluoroscopy. Wounds were irrigated with copious saline solution and closed in layers. Patient tolerated the procedures and anesthesia well. Post operative compression dressing and posterior splint applied to the left lower extremity. Patient was then transferred to the PACU for post operative monitoring. From there, He will be discharged home and follow up in my office next week. Estimated Blood Loss:  2ml    Tourniquet Time:   Total Tourniquet Time Documented: Ankle (Left) - 109 minutes  Total: Ankle (Left) - 109 minutes        Implants:   Implant Name Type Inv.  Item Serial No.  Lot No. LRB No. Used Action   Allos CC7   [id number]  NA Left 1 Implanted   SeaSpine Putty   [id number] SEASPINE [id number] Left 1 Implanted   MTP PLATE 5 DORSIFLEXION 5 VOLGUS LONG   N/A ARTHREX [id number] Left 1 Implanted   3.0 X 18 CORTICAL SCREW   N/A ARTHREX N/A Left 1 Implanted   3.0 X 10 LOCKING SCREW   N/A ARTHREX N/A Left 1 Implanted   3.0 X 12 LOCKING SCREW   N/A ARTHREX N/A Left 1 Implanted   3.0 X 14 LOCKING SCREW   N/A ARTHREX N/A Left 1 Implanted   SCR BNE LCK 3X16MM VA TI --  - SN/A  SCR BNE LCK 3X16MM VA TI --  N/A [employer] INC_WD N/A Left 2 Implanted   SYSTEM IMPL FOREFOOT PEEK W/ 3X8MM TENODESIS SCR OBLONG - [id number]  SYSTEM IMPL FOREFOOT PEEK W/ 3X8MM TENODESIS SCR OBLONG [id number] ARTHREX INC_WD [id number] Left 1 Implanted   SCREW BNE L13MM DIA2MM EDWARD FT ANK TI SELF DRL ST SLD FULL - SNA  SCREW BNE L13MM DIA2MM EDWARD FT ANK TI SELF DRL ST SLD FULL NA [employer]WD NA Left 1 Implanted               Specimens: * No specimens in log *        Drains: None                Complications: None    Counts: Sponge and needle counts were correct times two.     Signed By:  Arvin Barnes DPM     April 9, 2021

## 2021-04-09 NOTE — INTERVAL H&P NOTE
Update History & Physical 
 
The Patient's History and Physical of March 31, 2021 was reviewed with the patient and I examined the patient. There was no change. The surgical site was confirmed by the patient and me. The patient was counseled at length about the risks of sona Covid-19 during their perioperative period and any recovery window from their procedure. The patient was made aware that sona Covid-19  may worsen their prognosis for recovering from their procedure and lend to a higher morbidity and/or mortality risk. All material risks, benefits, and reasonable alternatives including postponing the procedure were discussed. The patient does  wish to proceed with the procedure at this time. Plan:  The risk, benefits, expected outcome, and alternative to the recommended procedure have been discussed with the patient. Patient understands and wants to proceed with the procedure.  
 
Electronically signed by Eduardo Heller DPM on 4/9/2021 at 6:58 AM

## 2021-04-09 NOTE — ANESTHESIA PREPROCEDURE EVALUATION
Relevant Problems   No relevant active problems       Anesthetic History   No history of anesthetic complications            Review of Systems / Medical History  Patient summary reviewed, nursing notes reviewed and pertinent labs reviewed    Pulmonary  Within defined limits                 Neuro/Psych         Headaches     Cardiovascular    Hypertension              Exercise tolerance: >4 METS     GI/Hepatic/Renal  Within defined limits              Endo/Other        Arthritis     Other Findings   Comments: PTSD  Marijuana use           Physical Exam    Airway  Mallampati: II    Neck ROM: normal range of motion   Mouth opening: Normal     Cardiovascular  Regular rate and rhythm,  S1 and S2 normal,  no murmur, click, rub, or gallop  Rhythm: regular  Rate: normal         Dental  No notable dental hx       Pulmonary  Breath sounds clear to auscultation               Abdominal  GI exam deferred       Other Findings            Anesthetic Plan    ASA: 2  Anesthesia type: regional - popliteal fossa block and saphenous block          Induction: Intravenous  Anesthetic plan and risks discussed with: Patient

## 2021-04-09 NOTE — PERIOP NOTES
Discharge instructions reviewed with Bryanna Simpson via phone. Opportunity for questions provided and reviewed with caregiver.

## 2021-04-09 NOTE — ANESTHESIA POSTPROCEDURE EVALUATION
Procedure(s):  FUSION 1ST METATARSAL PHALANGEAL JOINT LEFT FOOT, REMOVAL OF HARDWARE LEFT, 2ND METATARSAL OSTEOTOMY LEFT FOOT/ CORRECTION OF 2ND TOE LEFT FOOT/BONE AUTO GRAFT LEFT FOOT (MAC W/POP W/SAPHENOUS CATHETER). regional    Anesthesia Post Evaluation      Multimodal analgesia: multimodal analgesia not used between 6 hours prior to anesthesia start to PACU discharge  Patient location during evaluation: PACU  Patient participation: complete - patient participated  Level of consciousness: awake  Pain management: adequate  Airway patency: patent  Anesthetic complications: no  Cardiovascular status: acceptable, blood pressure returned to baseline and hemodynamically stable  Respiratory status: acceptable  Hydration status: acceptable  Post anesthesia nausea and vomiting:  controlled      INITIAL Post-op Vital signs:   Vitals Value Taken Time   /86 04/09/21 1025   Temp 36.2 °C (97.1 °F) 04/09/21 1025   Pulse 80 04/09/21 1030   Resp 14 04/09/21 1030   SpO2 95 % 04/09/21 1030   Vitals shown include unvalidated device data.

## 2021-04-09 NOTE — BRIEF OP NOTE
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  Item Serial No.  Lot No. LRB No. Used Action   Allos CC7   [id number]  NA Left 1 Implanted   SeaSpine Putty   [id number] SEASPINE [id number] Left 1 Implanted   MTP PLATE 5 DORSIFLEXION 5 VOLGUS LONG   N/A ARTHREX [id number] Left 1 Implanted   3.0 X 18 CORTICAL SCREW   N/A ARTHREX N/A Left 1 Implanted   3.0 X 10 LOCKING SCREW   N/A ARTHREX N/A Left 1 Implanted   3.0 X 12 LOCKING SCREW   N/A ARTHREX N/A Left 1 Implanted   3.0 X 14 LOCKING SCREW   N/A ARTHREX N/A Left 1 Implanted   SCR BNE LCK 3X16MM VA TI --  - SN/A  SCR BNE LCK 3X16MM VA TI --  N/A [employer]WD N/A Left 2 Implanted   SYSTEM IMPL FOREFOOT PEEK W/ 3X8MM TENODESIS SCR OBLONG - [id number]  SYSTEM IMPL FOREFOOT PEEK W/ 3X8MM TENODESIS SCR [place] [id number] [employer]_WD [id number] Left 1 Implanted   SCREW BNE L13MM DIA2MM EDWARD FT ANK TI SELF DRL ST SLD FULL - SNA  SCREW BNE L13MM DIA2MM EDWARD FT ANK TI SELF DRL ST SLD FULL NA [employer]WD NA Left 1 Implanted       Drains: * No LDAs found *    Findings: metalosis and dendritic synovitis left first mtpj. Plantar plate intact 2nd mtpj but unstable lateral collateral ligament.     Electronically Signed by Alana Damon DPM on 4/9/2021 at 9:58 AM

## 2021-04-09 NOTE — ANESTHESIA PROCEDURE NOTES
Peripheral Block    Start time: 4/9/2021 7:12 AM  End time: 4/9/2021 7:27 AM  Performed by: Hai Stephens MD  Authorized by: Hai Stephens MD       Pre-procedure: Indications: at surgeon's request and post-op pain management    Preanesthetic Checklist: patient identified, risks and benefits discussed, site marked, timeout performed, anesthesia consent given and patient being monitored    Timeout Time: 07:12          Block Type:   Block Type:  Popliteal and saphenous  Laterality:  Left  Monitoring:  Continuous pulse ox, frequent vital sign checks, heart rate, responsive to questions and oxygen  Injection Technique:  Continuous  Procedures: ultrasound guided and nerve stimulator    Patient Position: supine  Prep: chlorhexidine    Location:  Upper thigh  Needle Type:  Tuohy  Needle Gauge:  18 G  Needle Localization:  Nerve stimulator and ultrasound guidance    Assessment:  Number of attempts:  1  Injection Assessment:  Incremental injection every 5 mL, local visualized surrounding nerve on ultrasound, negative aspiration for blood, no paresthesia and no intravascular symptoms  Patient tolerance:  Patient tolerated the procedure well with no immediate complications  Saphenous block performed with ultrasound guidance; 4\" stimuplex 21g needle used, 10cc 0.5% ropivacaine injected slowly with intermittent aspiration. Saphenous block performed by Duy Everett CRNA.

## 2021-04-09 NOTE — DISCHARGE INSTRUCTIONS
Dr. Hesham Mata. ADALBERTO Davidson FACFAS FACFAOM FACCWS    The [facility]  Post Operative Instructions: You have had a surgical procedure on your left foot. Fluids and Diet:  Begin with clear liquids, broth, dry toast, and crackers. If not nauseated then resume your regular pre-operative diet when you are ready    Medications: Take your prescriptions as directed  If your pain is not severe then you may take the non-prescription medication that you normally take for aches and pains  You may resume your regularly scheduled medications (unless otherwise directed)  If any side effects or adverse reactions occur, discontinue the medication and contact your doctor. Review the patient drug information that is provided before you take any medication    Ambulation and Activity:  You are advised to go directly home from the hospital  Use crutches, walker, knee scooter as needed  You may not put weight on the operated foot. Avoid stairs if possible. Do not lift or move heavy objects  Do not drive until cleared by Dr. Flora Huff and Wound Care Instructions:  Keep bandage clean and dry  Do not shower or bathe the operative extremity  Do not remove the bandage (unless otherwise directed)  Do not attempt to put anything between the cast or dressing and your skin, some itching is normal.    Ice and Elevation:  Elevate operative extremity as much as possible to reduce swelling and discomfort. Elevate with 2 pillows at or above the level of the heart for the first 72 hours. Ice:  [facility] dispensed insulated ice bag over the bandage 20 minutes of every hour while awake for the first 72 hours. You may behind the knee as well. Special Instructions: Call your doctor immediately if you develop any of the following. Fever over 100 degrees by mouth - take your temperature daily until your first follow up visit.   Pain not relieved by medication ordered  Swelling, increased redness, warmth, or hardness around operative area. Numb, tingling or cold toes. Toe(s) become white or bluish  Bandage becomes wet, soiled, or blood soaked (small amount of bleeding may be normal)  Increased or progressive drainage from surgical area. Follow up instructions: Your first post operative appointment is scheduled for Tuesday    Call Dr. Young Brought office if you have any questions or concerns. DISCHARGE SUMMARY from your Nurse      PATIENT INSTRUCTIONS    After general anesthesia or intravenous sedation, for 24 hours or while taking prescription Narcotics:  · Limit your activities  · Do not drive and operate hazardous machinery  · Do not make important personal or business decisions  · Do  not drink alcoholic beverages  · If you have not urinated within 8 hours after discharge, please contact your surgeon on call. Report the following to your surgeon:  · Excessive pain, swelling, redness or odor of or around the surgical area  · Temperature over 100.5  · Nausea and vomiting lasting longer than 4 hours or if unable to take medications  · Any signs of decreased circulation or nerve impairment to extremity: change in color, persistent  numbness, tingling, coldness or increase pain  · Any questions      GOOD HELP TO FIGHT AN INFECTION  Here are a few tip to help reduce the chance of getting an infection after surgery:   Wash Your Hands   Good handwashing is the most important thing you and your caregiver can do.  Wash before and after caring for any wounds. Dry your hand with a clean towel.  Wash with soap and water for at least 20 seconds. A TIP: sing the \"Happy Birthday\" song through one time while washing to help with the timing.  Use a hand  in between washings.  Shower   When your surgeon says it is OK to take a shower, use a new bar of antibacterial soap (if that is what you use, and keep that bar of soap ONLY for your use), or antibacterial body wash.    Use a clean wash cloth or sponge when you bathe.   Dry off with a clean towel  after every bath - be careful around any wounds, skin staples, sutures or surgical glue over/on wounds.  Do not enter swimming pools, hot tubs, lakes, rivers and/or ocean until wounds are healed and your doctor/surgeon says it is OK.  Use Clean Sheets   Sleep on freshly laundered sheets after your surgery.  Keep the surgery site covered with a clean, dry bandage (if instructed to do so). If the bandage becomes soiled, reapply a new, dry, clean bandage.  Do not allow pets to sleep with you while your wound is healing.  Lifestyle Modification and Controlling Your Blood Sugar   Smoking slows wound healing. Stop smoking and limit exposure to second-hand smoke.  High blood sugar slows wound healing. Eat a well-balanced diet to provide proper nutrition while healing   Monitor your blood sugar (if you are a diabetic) and take your medications as you are suppose to so you can control you blood sugar after surgery. COUGH AND DEEP BREATHE    Breathing deeply and coughing are very important exercises to do after surgery. Deep breathing and coughing open the little air tubes and air sacks in your lungs. You take deep breaths every day. You may not even notice - it is just something you do when you sigh or yawn. It is a natural exercise you do to keep these air passages open. After surgery, take deep breaths and cough, on purpose. DIRECTIONS:  · Take 10 to 15 slow deep breaths every hour while awake. · Breathe in deeply, and hold it for 2 seconds. · Exhale slowly through puckered lips, like blowing up a balloon. · After every 4th or 5th deep breath, hug your pillow to your chest or belly and give a hard, deep cough. Yes, it will probably hurt. But doing this exercise is a very important part of healing after surgery. Take your pain medicine to help you do this exercise without too much pain.     Coughing and deep breathing help prevent bronchitis and pneumonia after surgery. If you had chest or belly surgery, use a pillow as a \"hug nguyễn\" and hold it tightly to your chest or belly when you cough. ANKLE PUMPS    Ankle pumps increase the circulation of oxygenated blood to your lower extremities and decrease your risk for circulation problems such as blood clots. They also stretch the muscles, tendons and ligaments in your foot and ankle, and prevent joint contracture in the ankle and foot, especially after surgeries on the legs. It is important to do ankle pump exercises regularly after surgery because immobility increases your risk for developing a blood clot. Your doctor may also have you take an Aspirin for the next few days as well. If your doctor did not ask you to take an Aspirin, consult with him before starting Aspirin therapy on your own. The exercise is quite simple. · Slowly point your foot forward, feeling the muscles on the top of your lower leg stretch, and hold this position for 5 seconds. · Next, pull your foot back toward you as far as possible, stretching the calf muscles, and hold that position for 5 seconds. · Repeat with the other foot. · Perform 10 repetitions every hour while awake for both ankles if possible (down and then up with the foot once is one repetition). You should feel gentle stretching of the muscles in your lower leg when doing this exercise. If you feel pain, or your range of motion is limited, don't push too hard. Only go the limit your joint and muscles will let you go. If you have increasing pain, progressively worsening leg warmth or swelling, STOP the exercise and call your doctor. MEDICATION AND   SIDE EFFECT GUIDE    The [facility] MEDICATION AND SIDE EFFECT GUIDE was provided to the PATIENT AND CARE PROVIDER.   Information provided includes instruction about drug purpose and common side effects for the following medications:   · No prescriptions provided. These are general instructions for a healthy lifestyle:    *   Please give a list of your current medications to your Primary Care Provider. *   Please update this list whenever your medications are discontinued, doses are changed, or new medications (including over-the-counter products) are added. *   Please carry medication information at all times in case of emergency situations. Congestive Heart Failure  You may be retaining fluid if you have a history of heart failure or if you experience any of the following symptoms:  Weight gain of 3 pounds or more overnight or 5 pounds in a week, increased swelling in your hands or feet or shortness of breath while lying flat in bed. Please call your doctor as soon as you notice any of these symptoms; do not wait until your next office visit.  squeezing, fullness, or pain.  Discomfort in other areas of the upper body. Symptoms can include pain or discomfort in one or both arms, the back, neck, jaw, or stomach.  Shortness of breath with or without chest discomfort.  Other signs may include breaking out in a cold sweat, nausea, or lightheadedness. Don't wait more than five minutes to call 911 - MINUTES MATTER! Fast action can save your life. Calling 911 is almost always the fastest way to get lifesaving treatment. Emergency Medical Services staff can begin treatment when they arrive -- up to an hour sooner than if someone gets to the hospital by car. Learning About Coronavirus [phone])  Coronavirus [phone]): Overview  What is coronavirus (COVID-19)? The coronavirus disease (COVID-19) is caused by a virus. It is an illness that was first found in [place], [place], in December 2019. It has since spread worldwide. The virus can cause fever, cough, and trouble breathing. In severe cases, it can cause pneumonia and make it hard to breathe without help. It can cause death. Coronaviruses are a large group of viruses. They cause the common cold. They also cause more serious illnesses like Middle East respiratory syndrome (MERS) and severe acute respiratory syndrome (SARS). COVID-19 is caused by a novel coronavirus. That means it's a new type that has not been seen in people before. This virus spreads person-to-person through droplets from coughing and sneezing. It can also spread when you are close to someone who is infected. And it can spread when you touch something that has the virus on it, such as a doorknob or a tabletop. What can you do to protect yourself from coronavirus (COVID-19)? The best way to protect yourself from getting sick is to:  · Avoid areas where there is an outbreak. · Avoid contact with people who may be infected. · Wash your hands often with soap or alcohol-based hand sanitizers. · Avoid crowds and try to stay at least 6 feet away from other people. · Wash your hands often, especially after you cough or sneeze. Use soap and water, and scrub for at least 20 seconds. If soap and water aren't available, use an alcohol-based hand . · Avoid touching your mouth, nose, and eyes. What can you do to avoid spreading the virus to others? To help avoid spreading the virus to others:  · Cover your mouth with a tissue when you cough or sneeze. Then throw the tissue in the trash. · Use a disinfectant to clean things that you touch often. · Stay home if you are sick or have been exposed to the virus. Don't go to school, work, or public areas. And don't use public transportation. · If you are sick:  ? Leave your home only if you need to get medical care. But call the doctor's office first so they know you're coming. And wear a face mask, if you have one.  ? If you have a face mask, wear it whenever you're around other people. It can help stop the spread of the virus when you cough or sneeze. ? Clean and disinfect your home every day. Use household  and disinfectant wipes or sprays.  Take special care to clean things that you grab with your hands. These include doorknobs, remote controls, phones, and handles on your refrigerator and microwave. And don't forget countertops, tabletops, bathrooms, and computer keyboards. When to call for help  Call 911 anytime you think you may need emergency care. For example, call if:  · You have severe trouble breathing. (You can't talk at all.)  · You have constant chest pain or pressure. · You are severely dizzy or lightheaded. · You are confused or can't think clearly. · Your face and lips have a blue color. · You pass out (lose consciousness) or are very hard to wake up. Call your doctor now if you develop symptoms such as:  · Shortness of breath. · Fever. · Cough. If you need to get care, call ahead to the doctor's office for instructions before you go. Make sure you wear a face mask, if you have one, to prevent exposing other people to the virus. Where can you get the latest information? The following health organizations are tracking and studying this virus. Their websites contain the most up-to-date information. Zulma Saucedo also learn what to do if you think you may have been exposed to the virus. · U.S. Centers for Disease Control and Prevention (CDC): The CDC provides updated news about the disease and travel advice. The website also tells you how to prevent the spread of infection. www.cdc.gov  · World Health Organization [facility]): WHO offers information about the virus outbreaks. WHO also has travel advice. www.who.int  Current as of: April 1, 2020               Content Version: 12.4  © 8232-3846 [employer]. Care instructions adapted under license by your healthcare professional. If you have questions about a medical condition or this instruction, always ask your healthcare professional. [address] any warranty or liability for your use of this information.     The discharge information has been reviewed with the friend. Any questions and concerns from the friend have been addressed. The friend verbalized understanding. CONTENTS FOUND IN YOUR DISCHARGE ENVELOPE:  [x]     Surgeon and Hospital Discharge Instructions  [x]     [facility] Care Provider Card  [x]     Medication & Side Effect Guide            (your newly prescribed medications have been marked/highlighted showing the most common side effects from   the classes of drugs on your prescriptions)  []     Medication Prescription(s) x *** ( [] These have been sent electronically to your pharmacy by your surgeon,   - OR -       your surgeon has already provided these to you during a previous/pre-op office visit)  []     [address]  []     Physical Therapy Prescription  []     Follow-up Appointment Cards  []     Surgery-related Pictures/Media  [x]     Pain block and/or block with On-Q Catheter from Anesthesia Service (information included in your instructions above)  []     Medical device information sheets/pamphlets from their    []     School/work excuse note. []     /parent work excuse note. The following personal items collected during your admission are returned to you:   Dental Appliance: Dental Appliances: None  Vision: Visual Aid: Glasses  Hearing Aid:    Jewelry: Jewelry: None  Clothing: Clothing: Other (comment), Hat(street clothing placed in bag and into locker)  Other Valuables:  Other Valuables: Eyeglasses  Valuables sent to safe:

## 2021-11-02 ENCOUNTER — TRANSCRIBE ORDER (OUTPATIENT)
Dept: SCHEDULING | Age: 61
End: 2021-11-02

## 2021-11-02 DIAGNOSIS — R19.00 INTRA-ABDOMINAL AND PELVIC SWELLING, MASS AND LUMP, UNSPECIFIED SITE: Primary | ICD-10-CM

## 2021-11-03 ENCOUNTER — TRANSCRIBE ORDER (OUTPATIENT)
Dept: SCHEDULING | Age: 61
End: 2021-11-03

## 2021-11-03 DIAGNOSIS — R19.00 INTRA-ABDOMINAL AND PELVIC SWELLING, MASS AND LUMP, UNSPECIFIED SITE: Primary | ICD-10-CM

## 2021-11-12 ENCOUNTER — HOSPITAL ENCOUNTER (OUTPATIENT)
Dept: CT IMAGING | Age: 61
Discharge: HOME | End: 2021-11-12
Attending: FAMILY MEDICINE
Payer: MEDICAID

## 2021-11-12 DIAGNOSIS — R19.00 INTRA-ABDOMINAL AND PELVIC SWELLING, MASS AND LUMP, UNSPECIFIED SITE: ICD-10-CM

## 2021-11-12 PROCEDURE — 74176 CT ABD & PELVIS W/O CONTRAST: CPT

## 2022-03-18 PROBLEM — M77.42 METATARSALGIA OF LEFT FOOT: Status: ACTIVE | Noted: 2021-04-09

## 2022-03-18 PROBLEM — M79.672 FOOT PAIN, LEFT: Status: ACTIVE | Noted: 2021-04-09

## 2022-03-19 PROBLEM — M20.5X2 CROSSOVER TOE DEFORMITY OF LEFT FOOT: Status: ACTIVE | Noted: 2021-04-09

## 2022-03-19 PROBLEM — M20.22 HALLUX RIGIDUS OF LEFT FOOT: Status: ACTIVE | Noted: 2020-06-13

## 2022-03-20 PROBLEM — M94.272 CHONDROMALACIA, LEFT ANKLE AND JOINTS OF LEFT FOOT: Status: ACTIVE | Noted: 2020-06-13

## 2024-03-01 ENCOUNTER — APPOINTMENT (OUTPATIENT)
Facility: HOSPITAL | Age: 64
End: 2024-03-01
Payer: MEDICARE

## 2024-03-01 ENCOUNTER — HOSPITAL ENCOUNTER (EMERGENCY)
Facility: HOSPITAL | Age: 64
Discharge: HOME | End: 2024-03-02
Attending: STUDENT IN AN ORGANIZED HEALTH CARE EDUCATION/TRAINING PROGRAM
Payer: MEDICARE

## 2024-03-01 VITALS
HEART RATE: 85 BPM | TEMPERATURE: 98.1 F | DIASTOLIC BLOOD PRESSURE: 72 MMHG | SYSTOLIC BLOOD PRESSURE: 115 MMHG | OXYGEN SATURATION: 100 % | WEIGHT: 200 LBS | HEIGHT: 69 IN | BODY MASS INDEX: 29.62 KG/M2 | RESPIRATION RATE: 17 BRPM

## 2024-03-01 DIAGNOSIS — K29.00 ACUTE GASTRITIS WITHOUT HEMORRHAGE, UNSPECIFIED GASTRITIS TYPE: Primary | ICD-10-CM

## 2024-03-01 LAB
ALBUMIN SERPL-MCNC: 3.5 G/DL (ref 3.5–5)
ALBUMIN/GLOB SERPL: 0.9 (ref 1.1–2.2)
ALP SERPL-CCNC: 99 U/L (ref 45–117)
ALT SERPL-CCNC: 45 U/L (ref 12–78)
ANION GAP SERPL CALC-SCNC: 11 MMOL/L (ref 5–15)
APPEARANCE UR: CLEAR
AST SERPL W P-5'-P-CCNC: 61 U/L (ref 15–37)
BACTERIA URNS QL MICRO: NEGATIVE /HPF
BASOPHILS # BLD: 0.1 K/UL (ref 0–0.1)
BASOPHILS NFR BLD: 0 % (ref 0–1)
BILIRUB SERPL-MCNC: 0.6 MG/DL (ref 0.2–1)
BILIRUB UR QL: NEGATIVE
BUN SERPL-MCNC: 12 MG/DL (ref 6–20)
BUN/CREAT SERPL: 18 (ref 12–20)
CA-I BLD-MCNC: 8.6 MG/DL (ref 8.5–10.1)
CHLORIDE SERPL-SCNC: 99 MMOL/L (ref 97–108)
CO2 SERPL-SCNC: 24 MMOL/L (ref 21–32)
COLOR UR: NORMAL
CREAT SERPL-MCNC: 0.65 MG/DL (ref 0.7–1.3)
DIFFERENTIAL METHOD BLD: ABNORMAL
EOSINOPHIL # BLD: 0.3 K/UL (ref 0–0.4)
EOSINOPHIL NFR BLD: 2 % (ref 0–7)
EPITH CASTS URNS QL MICRO: NORMAL /LPF
ERYTHROCYTE [DISTWIDTH] IN BLOOD BY AUTOMATED COUNT: 12.9 % (ref 11.5–14.5)
GLOBULIN SER CALC-MCNC: 3.9 G/DL (ref 2–4)
GLUCOSE SERPL-MCNC: 109 MG/DL (ref 65–100)
GLUCOSE UR STRIP.AUTO-MCNC: NEGATIVE MG/DL
HCT VFR BLD AUTO: 45.9 % (ref 36.6–50.3)
HGB BLD-MCNC: 16.7 G/DL (ref 12.1–17)
HGB UR QL STRIP: NEGATIVE
IMM GRANULOCYTES # BLD AUTO: 0.1 K/UL (ref 0–0.04)
IMM GRANULOCYTES NFR BLD AUTO: 1 % (ref 0–0.5)
KETONES UR QL STRIP.AUTO: NEGATIVE MG/DL
LEUKOCYTE ESTERASE UR QL STRIP.AUTO: NEGATIVE
LYMPHOCYTES # BLD: 3.1 K/UL (ref 0.8–3.5)
LYMPHOCYTES NFR BLD: 25 % (ref 12–49)
MAGNESIUM SERPL-MCNC: 1.6 MG/DL (ref 1.6–2.4)
MCH RBC QN AUTO: 35.4 PG (ref 26–34)
MCHC RBC AUTO-ENTMCNC: 36.4 G/DL (ref 30–36.5)
MCV RBC AUTO: 97.2 FL (ref 80–99)
MONOCYTES # BLD: 1.6 K/UL (ref 0–1)
MONOCYTES NFR BLD: 13 % (ref 5–13)
MUCOUS THREADS URNS QL MICRO: NEGATIVE /LPF
NEUTS SEG # BLD: 7.4 K/UL (ref 1.8–8)
NEUTS SEG NFR BLD: 59 % (ref 32–75)
NITRITE UR QL STRIP.AUTO: NEGATIVE
NRBC # BLD: 0 K/UL (ref 0–0.01)
NRBC BLD-RTO: 0 PER 100 WBC
PH UR STRIP: 6 (ref 5–8)
PLATELET # BLD AUTO: 197 K/UL (ref 150–400)
PMV BLD AUTO: 9.6 FL (ref 8.9–12.9)
POTASSIUM SERPL-SCNC: 2.9 MMOL/L (ref 3.5–5.1)
PROT SERPL-MCNC: 7.4 G/DL (ref 6.4–8.2)
PROT UR STRIP-MCNC: NEGATIVE MG/DL
RBC # BLD AUTO: 4.72 M/UL (ref 4.1–5.7)
RBC #/AREA URNS HPF: NORMAL /HPF (ref 0–5)
SODIUM SERPL-SCNC: 134 MMOL/L (ref 136–145)
SP GR UR REFRACTOMETRY: <1.005 (ref 1–1.03)
TROPONIN I SERPL HS-MCNC: 9 NG/L (ref 0–76)
URINE CULTURE IF INDICATED: NORMAL
UROBILINOGEN UR QL STRIP.AUTO: 0.1 EU/DL (ref 0.1–1)
WBC # BLD AUTO: 12.4 K/UL (ref 4.1–11.1)
WBC URNS QL MICRO: NORMAL /HPF (ref 0–4)

## 2024-03-01 PROCEDURE — 96374 THER/PROPH/DIAG INJ IV PUSH: CPT

## 2024-03-01 PROCEDURE — 85025 COMPLETE CBC W/AUTO DIFF WBC: CPT

## 2024-03-01 PROCEDURE — 94760 N-INVAS EAR/PLS OXIMETRY 1: CPT

## 2024-03-01 PROCEDURE — 93005 ELECTROCARDIOGRAM TRACING: CPT | Performed by: STUDENT IN AN ORGANIZED HEALTH CARE EDUCATION/TRAINING PROGRAM

## 2024-03-01 PROCEDURE — 6370000000 HC RX 637 (ALT 250 FOR IP): Performed by: STUDENT IN AN ORGANIZED HEALTH CARE EDUCATION/TRAINING PROGRAM

## 2024-03-01 PROCEDURE — 36415 COLL VENOUS BLD VENIPUNCTURE: CPT

## 2024-03-01 PROCEDURE — 96375 TX/PRO/DX INJ NEW DRUG ADDON: CPT

## 2024-03-01 PROCEDURE — 81001 URINALYSIS AUTO W/SCOPE: CPT

## 2024-03-01 PROCEDURE — 74177 CT ABD & PELVIS W/CONTRAST: CPT

## 2024-03-01 PROCEDURE — 84484 ASSAY OF TROPONIN QUANT: CPT

## 2024-03-01 PROCEDURE — 71045 X-RAY EXAM CHEST 1 VIEW: CPT

## 2024-03-01 PROCEDURE — 80053 COMPREHEN METABOLIC PANEL: CPT

## 2024-03-01 PROCEDURE — 99285 EMERGENCY DEPT VISIT HI MDM: CPT

## 2024-03-01 PROCEDURE — 6360000002 HC RX W HCPCS: Performed by: STUDENT IN AN ORGANIZED HEALTH CARE EDUCATION/TRAINING PROGRAM

## 2024-03-01 PROCEDURE — 83735 ASSAY OF MAGNESIUM: CPT

## 2024-03-01 PROCEDURE — 6360000004 HC RX CONTRAST MEDICATION: Performed by: STUDENT IN AN ORGANIZED HEALTH CARE EDUCATION/TRAINING PROGRAM

## 2024-03-01 PROCEDURE — 2580000003 HC RX 258: Performed by: STUDENT IN AN ORGANIZED HEALTH CARE EDUCATION/TRAINING PROGRAM

## 2024-03-01 RX ORDER — KETOROLAC TROMETHAMINE 15 MG/ML
15 INJECTION, SOLUTION INTRAMUSCULAR; INTRAVENOUS
Status: COMPLETED | OUTPATIENT
Start: 2024-03-01 | End: 2024-03-01

## 2024-03-01 RX ORDER — SODIUM CHLORIDE, SODIUM LACTATE, POTASSIUM CHLORIDE, AND CALCIUM CHLORIDE .6; .31; .03; .02 G/100ML; G/100ML; G/100ML; G/100ML
1000 INJECTION, SOLUTION INTRAVENOUS
Status: COMPLETED | OUTPATIENT
Start: 2024-03-01 | End: 2024-03-02

## 2024-03-01 RX ORDER — POTASSIUM CHLORIDE 750 MG/1
40 TABLET, FILM COATED, EXTENDED RELEASE ORAL ONCE
Status: COMPLETED | OUTPATIENT
Start: 2024-03-01 | End: 2024-03-01

## 2024-03-01 RX ORDER — ONDANSETRON 2 MG/ML
4 INJECTION INTRAMUSCULAR; INTRAVENOUS ONCE
Status: COMPLETED | OUTPATIENT
Start: 2024-03-01 | End: 2024-03-01

## 2024-03-01 RX ADMIN — KETOROLAC TROMETHAMINE 15 MG: 15 INJECTION, SOLUTION INTRAMUSCULAR; INTRAVENOUS at 23:34

## 2024-03-01 RX ADMIN — ONDANSETRON 4 MG: 2 INJECTION INTRAMUSCULAR; INTRAVENOUS at 23:34

## 2024-03-01 RX ADMIN — POTASSIUM CHLORIDE 40 MEQ: 750 TABLET, EXTENDED RELEASE ORAL at 23:35

## 2024-03-01 RX ADMIN — IOPAMIDOL 100 ML: 755 INJECTION, SOLUTION INTRAVENOUS at 23:23

## 2024-03-01 RX ADMIN — SODIUM CHLORIDE, POTASSIUM CHLORIDE, SODIUM LACTATE AND CALCIUM CHLORIDE 1000 ML: 600; 310; 30; 20 INJECTION, SOLUTION INTRAVENOUS at 23:34

## 2024-03-01 ASSESSMENT — LIFESTYLE VARIABLES
HOW MANY STANDARD DRINKS CONTAINING ALCOHOL DO YOU HAVE ON A TYPICAL DAY: 3 OR 4
HOW OFTEN DO YOU HAVE A DRINK CONTAINING ALCOHOL: 4 OR MORE TIMES A WEEK

## 2024-03-02 LAB
EKG ATRIAL RATE: 96 BPM
EKG DIAGNOSIS: NORMAL
EKG P AXIS: 57 DEGREES
EKG P-R INTERVAL: 138 MS
EKG Q-T INTERVAL: 358 MS
EKG QRS DURATION: 102 MS
EKG QTC CALCULATION (BAZETT): 452 MS
EKG R AXIS: 62 DEGREES
EKG T AXIS: 74 DEGREES
EKG VENTRICULAR RATE: 96 BPM
TROPONIN I SERPL HS-MCNC: 9 NG/L (ref 0–76)

## 2024-03-02 NOTE — ED TRIAGE NOTES
Pt states he has been having lower abdominal pain, groin pain, bilateral flank pain, chest pain and urinary frequency and urgency x several days. Pt jumps randomly and shouts stating it is his flank pains. CP is what brought him in. AO4. Alcohol abuse. Endorses not urinating normally for 2 days.

## 2024-03-02 NOTE — ED PROVIDER NOTES
[facility] EMERGENCY DEPT  EMERGENCY DEPARTMENT HISTORY AND PHYSICAL EXAM      Date: 3/1/2024  Patient Name: Valentin Chow  MRN: [id number]  Birthdate 1960  Date of evaluation: 3/1/2024  Provider: Jason Walden DO   Note Started: 8:06 AM EST 3/2/24    HISTORY OF PRESENT ILLNESS     Chief Complaint   Patient presents with    Flank Pain    Abdominal Pain    Urinary Urgency    Chest Pain       History Provided By: Patient    HPI: Valentin Chow is a 63 y.o. male with past medical history as reviewed below who presents emergency department due to diffuse abdominal pain and decreased urinary output has been getting progressively worse over the past 2 days.  Patient states that he had onset of worsening abdominal pains with prompted his visit to the emergency department.  Denies any fever, vomiting, dysuria, hematuria, diarrhea, chest pain, shortness of breath or any other symptoms complaints.  States he has not tried anything for his pain.  Has never had pain like this in the past.  Denies any sick contacts.  Denies any new foods.    PAST MEDICAL HISTORY   Past Medical History:  Past Medical History:   Diagnosis Date    Alcohol use     Anxiety     Hx of migraines 2018    Hypertension     Insomnia disorder     Orbital fracture (HCC) 2018    Right eye from fall    PTSD (post-traumatic stress disorder)     Tinnitus        Past Surgical History:  Past Surgical History:   Procedure Laterality Date    BUNIONECTOMY Bilateral 2006    COLONOSCOPY  2015    HAMMER TOE SURGERY Right 06/09/2020    SEPTOPLASTY  1975       Family History:  Family History   Problem Relation Age of Onset    Anesth Problems Neg Hx     Deep Vein Thrombosis Neg Hx        Social History:  Social History     Tobacco Use    Smoking status: Former     Current packs/day: 0.00     Types: Cigarettes     Quit date: 6/1/2020     Years since quitting: 3.7    Smokeless tobacco: Never    Tobacco comments:     Quit smoking: Stopped for surgery- several weeks ago

## 2024-03-02 NOTE — DISCHARGE INSTRUCTIONS
Thank you!  Thank you for allowing me to care for you in the emergency department. It is my goal to provide you with excellent care.  Please fill out the survey that will come to you by mail or email since we listen to your feedback!     Below you will find a list of your tests from today's visit.  Should you have any questions, please do not hesitate to call the emergency department.    Labs  Recent Results (from the past 12 hour(s))   CBC with Auto Differential    Collection Time: 03/01/24 10:00 PM   Result Value Ref Range    WBC 12.4 (H) 4.1 - 11.1 K/uL    RBC 4.72 4.10 - 5.70 M/uL    Hemoglobin 16.7 12.1 - 17.0 g/dL    Hematocrit 45.9 36.6 - 50.3 %    MCV 97.2 80.0 - 99.0 FL    MCH 35.4 (H) 26.0 - 34.0 PG    MCHC 36.4 30.0 - 36.5 g/dL    RDW 12.9 11.5 - 14.5 %    Platelets 197 150 - 400 K/uL    MPV 9.6 8.9 - 12.9 FL    Nucleated RBCs 0.0 0.0  WBC    nRBC 0.00 0.00 - 0.01 K/uL    Neutrophils % 59 32 - 75 %    Lymphocytes % 25 12 - 49 %    Monocytes % 13 5 - 13 %    Eosinophils % 2 0 - 7 %    Basophils % 0 0 - 1 %    Immature Granulocytes 1 (H) 0 - 0.5 %    Neutrophils Absolute 7.4 1.8 - 8.0 K/UL    Lymphocytes Absolute 3.1 0.8 - 3.5 K/UL    Monocytes Absolute 1.6 (H) 0.0 - 1.0 K/UL    Eosinophils Absolute 0.3 0.0 - 0.4 K/UL    Basophils Absolute 0.1 0.0 - 0.1 K/UL    Absolute Immature Granulocyte 0.1 (H) 0.00 - 0.04 K/UL    Differential Type AUTOMATED     Comprehensive Metabolic Panel    Collection Time: 03/01/24 10:00 PM   Result Value Ref Range    Sodium 134 (L) 136 - 145 mmol/L    Potassium 2.9 (L) 3.5 - 5.1 mmol/L    Chloride 99 97 - 108 mmol/L    CO2 24 21 - 32 mmol/L    Anion Gap 11 5 - 15 mmol/L    Glucose 109 (H) 65 - 100 mg/dL    BUN 12 6 - 20 mg/dL    Creatinine 0.65 (L) 0.70 - 1.30 mg/dL    Bun/Cre Ratio 18 12 - 20      Est, Glom Filt Rate >60 >60 ml/min/1.73m2    Calcium 8.6 8.5 - 10.1 mg/dL    Total Bilirubin 0.6 0.2 - 1.0 mg/dL    AST 61 (H) 15 - 37 U/L    ALT 45 12 - 78 U/L    Alk  follow-up appointment.     If you have any problem arranging a follow-up appointment, contact the Emergency Department.  If your symptoms become worse or you do not improve as expected and you are unable to reach your health care provider, please return to the Emergency Department. We are available 24 hours a day.     If a prescription has been provided, please have it filled as soon as possible to prevent a delay in treatment. If you have any questions or reservations about taking the medication due to side effects or interactions with other medications, please call your primary care provider or contact the ER.

## 2024-05-14 ENCOUNTER — HOSPITAL ENCOUNTER (EMERGENCY)
Facility: HOSPITAL | Age: 64
Discharge: HOME | End: 2024-05-14
Attending: EMERGENCY MEDICINE
Payer: MEDICARE

## 2024-05-14 VITALS
SYSTOLIC BLOOD PRESSURE: 143 MMHG | BODY MASS INDEX: 27.92 KG/M2 | WEIGHT: 195 LBS | TEMPERATURE: 97.9 F | OXYGEN SATURATION: 99 % | HEIGHT: 70 IN | HEART RATE: 99 BPM | RESPIRATION RATE: 15 BRPM | DIASTOLIC BLOOD PRESSURE: 96 MMHG

## 2024-05-14 DIAGNOSIS — S05.91XA RIGHT EYE INJURY, INITIAL ENCOUNTER: Primary | ICD-10-CM

## 2024-05-14 PROCEDURE — 6370000000 HC RX 637 (ALT 250 FOR IP): Performed by: EMERGENCY MEDICINE

## 2024-05-14 PROCEDURE — 99283 EMERGENCY DEPT VISIT LOW MDM: CPT

## 2024-05-14 RX ORDER — TETRACAINE HYDROCHLORIDE 5 MG/ML
1 SOLUTION OPHTHALMIC
Status: COMPLETED | OUTPATIENT
Start: 2024-05-14 | End: 2024-05-14

## 2024-05-14 RX ADMIN — TETRACAINE HYDROCHLORIDE 1 DROP: 5 SOLUTION OPHTHALMIC at 11:57

## 2024-05-14 RX ADMIN — FLUORESCEIN SODIUM 1 MG: 1 STRIP OPHTHALMIC at 11:57

## 2024-05-14 ASSESSMENT — PAIN - FUNCTIONAL ASSESSMENT: PAIN_FUNCTIONAL_ASSESSMENT: 0-10

## 2024-05-14 ASSESSMENT — PAIN SCALES - GENERAL: PAINLEVEL_OUTOF10: 8

## 2024-05-14 ASSESSMENT — LIFESTYLE VARIABLES
HOW OFTEN DO YOU HAVE A DRINK CONTAINING ALCOHOL: NEVER
HOW MANY STANDARD DRINKS CONTAINING ALCOHOL DO YOU HAVE ON A TYPICAL DAY: PATIENT DOES NOT DRINK

## 2024-05-14 NOTE — PROCEDURES
Procedure Note - Wood's lamp exam:  1:02 PM EDT  Performed by: JASPER Marr   Pt’s R eye was anesthetized with tetracaine, stained with fluorescein, and examined with a Wood's lamp, using lid eversion.    Foreign body: no  Fluorescein uptake: no, showing no evidence of abrasion or foreign body.   The procedure took 1-15 minutes, and pt tolerated well.

## 2024-05-14 NOTE — ED TRIAGE NOTES
Patient states he fell in bathroom and hit his head, and states a screw went in his eye. Patient complains of headache and eye pain, - blood thinner and no loc

## 2024-05-14 NOTE — ED PROVIDER NOTES
[facility] EMERGENCY DEPT  EMERGENCY DEPARTMENT HISTORY AND PHYSICAL EXAM      Date: 5/14/2024  Patient Name: Valentin Chow  MRN: [id number]  Birthdate 1960  Date of evaluation: 5/14/2024  Provider: Eddi Leavitt MD   Note Started: 11:11 AM EDT 5/14/24    HISTORY OF PRESENT ILLNESS     Chief Complaint   Patient presents with    Fall       History Provided By: Patient    HPI: Valentin Chow is a 63 y.o. male presents for evaluation of right eye injury.  Patient states he fell and hit the back of a screw on his right eye.  Patient reports the fall was mechanical, states that he slipped on the floor.  Patient denies LOC, denies being on blood thinner.    PAST MEDICAL HISTORY   Past Medical History:  Past Medical History:   Diagnosis Date    Alcohol use     Anxiety     Hx of migraines 2018    Hypertension     Insomnia disorder     Orbital fracture (HCC) 2018    Right eye from fall    PTSD (post-traumatic stress disorder)     Tinnitus        Past Surgical History:  Past Surgical History:   Procedure Laterality Date    BUNIONECTOMY Bilateral 2006    COLONOSCOPY  2015    HAMMER TOE SURGERY Right 06/09/2020    SEPTOPLASTY  1975       Family History:  Family History   Problem Relation Age of Onset    Anesth Problems Neg Hx     Deep Vein Thrombosis Neg Hx        Social History:  Social History     Tobacco Use    Smoking status: Former     Current packs/day: 0.00     Types: Cigarettes     Quit date: 6/1/2020     Years since quitting: 3.9    Smokeless tobacco: Never    Tobacco comments:     Quit smoking: Stopped for surgery- several weeks ago   Substance Use Topics    Alcohol use: Yes     Alcohol/week: 6.0 standard drinks of alcohol    Drug use: Yes     Frequency: 3.0 times per week     Types: Marijuana (Weed)       Allergies:  No Known Allergies    PCP: Zeb Wolf MD    Current Meds:   No current facility-administered medications for this encounter.     Current Outpatient Medications   Medication Sig Dispense

## (undated) DEVICE — SUTURE VCRL SZ 3-0 L27IN ABSRB UD L26MM SH 1/2 CIR J416H

## (undated) DEVICE — CANISTER, RIGID, 3000CC: Brand: MEDLINE INDUSTRIES, INC.

## (undated) DEVICE — INTENDED FOR TISSUE SEPARATION, AND OTHER PROCEDURES THAT REQUIRE A SHARP SURGICAL BLADE TO PUNCTURE OR CUT.: Brand: BARD-PARKER ® CARBON RIB-BACK BLADES

## (undated) DEVICE — BLADE OSC SAW 40X11MM -- STRKER

## (undated) DEVICE — SOL IRRIGATION INJ NACL 0.9% 500ML BTL

## (undated) DEVICE — REM POLYHESIVE ADULT PATIENT RETURN ELECTRODE: Brand: VALLEYLAB

## (undated) DEVICE — BANDAGE,ELASTIC,ESMARK,STERILE,4"X9',LF: Brand: MEDLINE

## (undated) DEVICE — NEEDLE HYPO 25GA L1.5IN BVL ORIENTED ECLIPSE

## (undated) DEVICE — ZIMMER® STERILE DISPOSABLE TOURNIQUET CUFF WITH PROTECTIVE SLEEVE AND PLC, DUAL PORT, SINGLE BLADDER, 18 IN. (46 CM)

## (undated) DEVICE — BANDAGE COBAN 4 IN COMPR W4INXL5YD FOAM COHESIVE QUIK STK SELF ADH SFT

## (undated) DEVICE — DRAPE C ARM W54XL84IN MINI FOR OEC 6800

## (undated) DEVICE — INFECTION CONTROL KIT SYS

## (undated) DEVICE — SUTURE ETHLN SZ 4-0 L18IN NONABSORBABLE BLK L19MM PS-2 3/8 1667H

## (undated) DEVICE — DRSG GZ OIL EMUL CURAD 3X3 --

## (undated) DEVICE — COVER LT HNDL PLAS RIG 1 PER PK

## (undated) DEVICE — SPONGE GZ W4XL4IN COT 12 PLY TYP VII WVN C FLD DSGN

## (undated) DEVICE — DRAPE,U/ SHT,SPLIT,PLAS,STERIL: Brand: MEDLINE

## (undated) DEVICE — GOWN,PREVENTION PLUS,XLN/2XL,ST,22/CS: Brand: MEDLINE

## (undated) DEVICE — ROCKER SWITCH PENCIL BLADE ELECTRODE, HOLSTER: Brand: EDGE

## (undated) DEVICE — STERILE POLYISOPRENE POWDER-FREE SURGICAL GLOVES WITH EMOLLIENT COATING: Brand: PROTEXIS

## (undated) DEVICE — .035 X 4 K-WIRE: Brand: OSTEOMED

## (undated) DEVICE — BANDAGE,GAUZE,BULKEE II,4.5"X4.1YD,STRL: Brand: MEDLINE

## (undated) DEVICE — STOCKINETTE,DOUBLE PLY,6X54,STERILE: Brand: MEDLINE

## (undated) DEVICE — BIT DRL 2.2MM DISP --

## (undated) DEVICE — Device

## (undated) DEVICE — SINGLE USE DEVICE INTENDED TO COVER EXPOSED ENDS OF ORTHOPEDIC PIN AND K-WIRES TO HELP PROTECT THE EXPOSED WIRE FROM SNAGGING ON CLOTHING.: Brand: OXBORO™ PIN COVER

## (undated) DEVICE — DRAPE,EXTREMITY,89X128,STERILE: Brand: MEDLINE

## (undated) DEVICE — BLADE SAW W9XL25MM THK051MM CUT THK074MM REPL S STL SAG

## (undated) DEVICE — STERILE POLYISOPRENE POWDER-FREE SURGICAL GLOVES: Brand: PROTEXIS

## (undated) DEVICE — PACK,BASIC,SIRUS,V: Brand: MEDLINE

## (undated) DEVICE — PREP KIT PEEL PTCH POVIDONE IOD

## (undated) DEVICE — SUTURE VCRL SZ 4-0 L27IN ABSRB UD L26MM SH 1/2 CIR J415H

## (undated) DEVICE — SURGICAL PROCEDURE PACK BASIN MAJ SET CUST NO CAUT

## (undated) DEVICE — BNDG ELAS HK LOOP 4X5YD NS -- MATRIX

## (undated) DEVICE — PREP SKN CHLRAPRP APL 26ML STR --

## (undated) DEVICE — GOWN,SIRUS,NONRNF,SETINSLV,2XL,18/CS: Brand: MEDLINE

## (undated) DEVICE — DRAPE SHT 3 QTR PROXIMA 53X77 --

## (undated) DEVICE — TUBING SUCT 10FR MAL ALUM SHFT FN CAP VENT UNIV CONN W/ OBT

## (undated) DEVICE — SYR 10ML LUER LOK 1/5ML GRAD --

## (undated) DEVICE — DRAPE C-ARM OEC ELIT MINVIEW -- WITH PLATE PROTECTOR

## (undated) DEVICE — DRAPE,REIN 53X77,STERILE: Brand: MEDLINE

## (undated) DEVICE — STRAP,POSITIONING,KNEE/BODY,FOAM,4X60": Brand: MEDLINE

## (undated) DEVICE — BANDAGE COMPR W6INXL10YD ST M E WHITE/BEIGE